# Patient Record
Sex: MALE | Race: WHITE | Employment: OTHER | URBAN - METROPOLITAN AREA
[De-identification: names, ages, dates, MRNs, and addresses within clinical notes are randomized per-mention and may not be internally consistent; named-entity substitution may affect disease eponyms.]

---

## 2022-09-09 ENCOUNTER — APPOINTMENT (OUTPATIENT)
Dept: CT IMAGING | Age: 72
DRG: 389 | End: 2022-09-09
Payer: MEDICARE

## 2022-09-09 ENCOUNTER — HOSPITAL ENCOUNTER (INPATIENT)
Age: 72
LOS: 2 days | Discharge: HOME | DRG: 389 | End: 2022-09-11
Attending: EMERGENCY MEDICINE | Admitting: STUDENT IN AN ORGANIZED HEALTH CARE EDUCATION/TRAINING PROGRAM
Payer: MEDICARE

## 2022-09-09 DIAGNOSIS — C22.9 MALIGNANT NEOPLASM OF LIVER, UNSPECIFIED LIVER MALIGNANCY TYPE (HCC): ICD-10-CM

## 2022-09-09 DIAGNOSIS — Z71.89 GOALS OF CARE, COUNSELING/DISCUSSION: ICD-10-CM

## 2022-09-09 DIAGNOSIS — F17.200 SMOKER: ICD-10-CM

## 2022-09-09 DIAGNOSIS — K56.609 SMALL BOWEL OBSTRUCTION (HCC): Primary | ICD-10-CM

## 2022-09-09 LAB
A/G RATIO: 1.6 (ref 1.1–2.2)
ALBUMIN SERPL-MCNC: 4.3 G/DL (ref 3.4–5)
ALP BLD-CCNC: 112 U/L (ref 40–129)
ALT SERPL-CCNC: 12 U/L (ref 10–40)
ANION GAP SERPL CALCULATED.3IONS-SCNC: 11 MMOL/L (ref 3–16)
AST SERPL-CCNC: 10 U/L (ref 15–37)
BASOPHILS ABSOLUTE: 0 K/UL (ref 0–0.2)
BASOPHILS RELATIVE PERCENT: 0.3 %
BILIRUB SERPL-MCNC: <0.2 MG/DL (ref 0–1)
BILIRUBIN URINE: NEGATIVE
BLOOD, URINE: NEGATIVE
BUN BLDV-MCNC: 10 MG/DL (ref 7–20)
CALCIUM SERPL-MCNC: 9.6 MG/DL (ref 8.3–10.6)
CHLORIDE BLD-SCNC: 97 MMOL/L (ref 99–110)
CLARITY: CLEAR
CO2: 25 MMOL/L (ref 21–32)
COLOR: YELLOW
CREAT SERPL-MCNC: <0.5 MG/DL (ref 0.8–1.3)
EOSINOPHILS ABSOLUTE: 0.1 K/UL (ref 0–0.6)
EOSINOPHILS RELATIVE PERCENT: 1.1 %
GFR AFRICAN AMERICAN: >60
GFR NON-AFRICAN AMERICAN: >60
GLUCOSE BLD-MCNC: 182 MG/DL (ref 70–99)
GLUCOSE BLD-MCNC: 202 MG/DL (ref 70–99)
GLUCOSE URINE: NEGATIVE MG/DL
HCT VFR BLD CALC: 43 % (ref 40.5–52.5)
HEMOGLOBIN: 14.4 G/DL (ref 13.5–17.5)
KETONES, URINE: NEGATIVE MG/DL
LEUKOCYTE ESTERASE, URINE: NEGATIVE
LIPASE: 16 U/L (ref 13–60)
LYMPHOCYTES ABSOLUTE: 1.8 K/UL (ref 1–5.1)
LYMPHOCYTES RELATIVE PERCENT: 16 %
MCH RBC QN AUTO: 30.2 PG (ref 26–34)
MCHC RBC AUTO-ENTMCNC: 33.5 G/DL (ref 31–36)
MCV RBC AUTO: 90.1 FL (ref 80–100)
MICROSCOPIC EXAMINATION: NORMAL
MONOCYTES ABSOLUTE: 0.8 K/UL (ref 0–1.3)
MONOCYTES RELATIVE PERCENT: 7.5 %
NEUTROPHILS ABSOLUTE: 8.4 K/UL (ref 1.7–7.7)
NEUTROPHILS RELATIVE PERCENT: 75.1 %
NITRITE, URINE: NEGATIVE
PDW BLD-RTO: 15 % (ref 12.4–15.4)
PERFORMED ON: ABNORMAL
PH UA: 7 (ref 5–8)
PLATELET # BLD: 262 K/UL (ref 135–450)
PMV BLD AUTO: 7.7 FL (ref 5–10.5)
POTASSIUM REFLEX MAGNESIUM: 4.1 MMOL/L (ref 3.5–5.1)
PROTEIN UA: NEGATIVE MG/DL
RBC # BLD: 4.77 M/UL (ref 4.2–5.9)
SODIUM BLD-SCNC: 133 MMOL/L (ref 136–145)
SPECIFIC GRAVITY UA: 1.06 (ref 1–1.03)
TOTAL PROTEIN: 7 G/DL (ref 6.4–8.2)
URINE REFLEX TO CULTURE: NORMAL
URINE TYPE: NORMAL
UROBILINOGEN, URINE: 1 E.U./DL
WBC # BLD: 11.1 K/UL (ref 4–11)

## 2022-09-09 PROCEDURE — 6370000000 HC RX 637 (ALT 250 FOR IP): Performed by: NURSE PRACTITIONER

## 2022-09-09 PROCEDURE — 2580000003 HC RX 258: Performed by: NURSE PRACTITIONER

## 2022-09-09 PROCEDURE — 96374 THER/PROPH/DIAG INJ IV PUSH: CPT

## 2022-09-09 PROCEDURE — 6360000002 HC RX W HCPCS

## 2022-09-09 PROCEDURE — 85025 COMPLETE CBC W/AUTO DIFF WBC: CPT

## 2022-09-09 PROCEDURE — 99285 EMERGENCY DEPT VISIT HI MDM: CPT

## 2022-09-09 PROCEDURE — 2580000003 HC RX 258

## 2022-09-09 PROCEDURE — 81003 URINALYSIS AUTO W/O SCOPE: CPT

## 2022-09-09 PROCEDURE — 74177 CT ABD & PELVIS W/CONTRAST: CPT

## 2022-09-09 PROCEDURE — 80053 COMPREHEN METABOLIC PANEL: CPT

## 2022-09-09 PROCEDURE — 6360000004 HC RX CONTRAST MEDICATION

## 2022-09-09 PROCEDURE — 96375 TX/PRO/DX INJ NEW DRUG ADDON: CPT

## 2022-09-09 PROCEDURE — 83690 ASSAY OF LIPASE: CPT

## 2022-09-09 PROCEDURE — 1200000000 HC SEMI PRIVATE

## 2022-09-09 RX ORDER — ALBUTEROL SULFATE 90 UG/1
2 AEROSOL, METERED RESPIRATORY (INHALATION) EVERY 4 HOURS PRN
Status: DISCONTINUED | OUTPATIENT
Start: 2022-09-09 | End: 2022-09-11 | Stop reason: HOSPADM

## 2022-09-09 RX ORDER — MORPHINE SULFATE 30 MG/1
30 TABLET, FILM COATED, EXTENDED RELEASE ORAL EVERY 12 HOURS SCHEDULED
Status: DISCONTINUED | OUTPATIENT
Start: 2022-09-09 | End: 2022-09-11 | Stop reason: HOSPADM

## 2022-09-09 RX ORDER — SODIUM CHLORIDE, SODIUM LACTATE, POTASSIUM CHLORIDE, CALCIUM CHLORIDE 600; 310; 30; 20 MG/100ML; MG/100ML; MG/100ML; MG/100ML
INJECTION, SOLUTION INTRAVENOUS CONTINUOUS
Status: DISCONTINUED | OUTPATIENT
Start: 2022-09-09 | End: 2022-09-11 | Stop reason: HOSPADM

## 2022-09-09 RX ORDER — ACETAMINOPHEN 650 MG/1
650 SUPPOSITORY RECTAL EVERY 6 HOURS PRN
Status: DISCONTINUED | OUTPATIENT
Start: 2022-09-09 | End: 2022-09-11 | Stop reason: HOSPADM

## 2022-09-09 RX ORDER — PIOGLITAZONEHYDROCHLORIDE 45 MG/1
45 TABLET ORAL DAILY
COMMUNITY
Start: 2022-09-07

## 2022-09-09 RX ORDER — SITAGLIPTIN 100 MG/1
100 TABLET, FILM COATED ORAL DAILY
COMMUNITY
Start: 2022-08-18

## 2022-09-09 RX ORDER — ALBUTEROL SULFATE 90 UG/1
1-2 AEROSOL, METERED RESPIRATORY (INHALATION)
COMMUNITY
Start: 2022-09-07

## 2022-09-09 RX ORDER — ALLOPURINOL 300 MG/1
300 TABLET ORAL DAILY
Status: DISCONTINUED | OUTPATIENT
Start: 2022-09-10 | End: 2022-09-11 | Stop reason: HOSPADM

## 2022-09-09 RX ORDER — ATORVASTATIN CALCIUM 10 MG/1
10 TABLET, FILM COATED ORAL DAILY
Status: DISCONTINUED | OUTPATIENT
Start: 2022-09-10 | End: 2022-09-11 | Stop reason: HOSPADM

## 2022-09-09 RX ORDER — DEXTROSE MONOHYDRATE 100 MG/ML
INJECTION, SOLUTION INTRAVENOUS CONTINUOUS PRN
Status: DISCONTINUED | OUTPATIENT
Start: 2022-09-09 | End: 2022-09-11 | Stop reason: HOSPADM

## 2022-09-09 RX ORDER — POLYETHYLENE GLYCOL 3350 17 G/17G
17 POWDER, FOR SOLUTION ORAL DAILY PRN
Status: DISCONTINUED | OUTPATIENT
Start: 2022-09-09 | End: 2022-09-11 | Stop reason: HOSPADM

## 2022-09-09 RX ORDER — GLIPIZIDE 10 MG/1
10 TABLET ORAL
COMMUNITY
Start: 2022-09-07

## 2022-09-09 RX ORDER — SODIUM CHLORIDE 9 MG/ML
INJECTION, SOLUTION INTRAVENOUS PRN
Status: DISCONTINUED | OUTPATIENT
Start: 2022-09-09 | End: 2022-09-11 | Stop reason: HOSPADM

## 2022-09-09 RX ORDER — ONDANSETRON 2 MG/ML
4 INJECTION INTRAMUSCULAR; INTRAVENOUS EVERY 6 HOURS PRN
Status: DISCONTINUED | OUTPATIENT
Start: 2022-09-09 | End: 2022-09-11 | Stop reason: HOSPADM

## 2022-09-09 RX ORDER — OXYCODONE HYDROCHLORIDE 5 MG/1
5 TABLET ORAL EVERY 6 HOURS PRN
Status: DISCONTINUED | OUTPATIENT
Start: 2022-09-09 | End: 2022-09-11 | Stop reason: HOSPADM

## 2022-09-09 RX ORDER — ONDANSETRON 2 MG/ML
4 INJECTION INTRAMUSCULAR; INTRAVENOUS ONCE
Status: COMPLETED | OUTPATIENT
Start: 2022-09-09 | End: 2022-09-09

## 2022-09-09 RX ORDER — SODIUM CHLORIDE 0.9 % (FLUSH) 0.9 %
5-40 SYRINGE (ML) INJECTION EVERY 12 HOURS SCHEDULED
Status: DISCONTINUED | OUTPATIENT
Start: 2022-09-09 | End: 2022-09-11 | Stop reason: HOSPADM

## 2022-09-09 RX ORDER — ENOXAPARIN SODIUM 100 MG/ML
40 INJECTION SUBCUTANEOUS DAILY
Status: DISCONTINUED | OUTPATIENT
Start: 2022-09-10 | End: 2022-09-11 | Stop reason: HOSPADM

## 2022-09-09 RX ORDER — OXYCODONE HYDROCHLORIDE 5 MG/1
5-10 TABLET ORAL EVERY 6 HOURS PRN
COMMUNITY
Start: 2022-08-17

## 2022-09-09 RX ORDER — SODIUM CHLORIDE, SODIUM LACTATE, POTASSIUM CHLORIDE, AND CALCIUM CHLORIDE .6; .31; .03; .02 G/100ML; G/100ML; G/100ML; G/100ML
1000 INJECTION, SOLUTION INTRAVENOUS ONCE
Status: COMPLETED | OUTPATIENT
Start: 2022-09-09 | End: 2022-09-09

## 2022-09-09 RX ORDER — ALLOPURINOL 300 MG/1
300 TABLET ORAL DAILY
COMMUNITY
Start: 2022-07-18

## 2022-09-09 RX ORDER — BUDESONIDE AND FORMOTEROL FUMARATE DIHYDRATE 160; 4.5 UG/1; UG/1
2 AEROSOL RESPIRATORY (INHALATION) 2 TIMES DAILY
COMMUNITY
Start: 2022-09-07

## 2022-09-09 RX ORDER — ONDANSETRON 4 MG/1
4 TABLET, ORALLY DISINTEGRATING ORAL EVERY 8 HOURS PRN
Status: DISCONTINUED | OUTPATIENT
Start: 2022-09-09 | End: 2022-09-11 | Stop reason: HOSPADM

## 2022-09-09 RX ORDER — ACETAMINOPHEN 325 MG/1
650 TABLET ORAL EVERY 6 HOURS PRN
Status: DISCONTINUED | OUTPATIENT
Start: 2022-09-09 | End: 2022-09-11 | Stop reason: HOSPADM

## 2022-09-09 RX ORDER — SODIUM CHLORIDE 0.9 % (FLUSH) 0.9 %
5-40 SYRINGE (ML) INJECTION PRN
Status: DISCONTINUED | OUTPATIENT
Start: 2022-09-09 | End: 2022-09-11 | Stop reason: HOSPADM

## 2022-09-09 RX ORDER — INSULIN LISPRO 100 [IU]/ML
0-4 INJECTION, SOLUTION INTRAVENOUS; SUBCUTANEOUS NIGHTLY
Status: DISCONTINUED | OUTPATIENT
Start: 2022-09-09 | End: 2022-09-11 | Stop reason: HOSPADM

## 2022-09-09 RX ORDER — INSULIN LISPRO 100 [IU]/ML
0-4 INJECTION, SOLUTION INTRAVENOUS; SUBCUTANEOUS
Status: DISCONTINUED | OUTPATIENT
Start: 2022-09-10 | End: 2022-09-11 | Stop reason: HOSPADM

## 2022-09-09 RX ORDER — ATORVASTATIN CALCIUM 10 MG/1
10 TABLET, FILM COATED ORAL DAILY
COMMUNITY
Start: 2022-08-31

## 2022-09-09 RX ORDER — MORPHINE SULFATE 30 MG/1
30 TABLET, FILM COATED, EXTENDED RELEASE ORAL 2 TIMES DAILY
COMMUNITY
Start: 2022-09-07

## 2022-09-09 RX ADMIN — IOPAMIDOL 75 ML: 755 INJECTION, SOLUTION INTRAVENOUS at 20:31

## 2022-09-09 RX ADMIN — MORPHINE SULFATE 30 MG: 30 TABLET, FILM COATED, EXTENDED RELEASE ORAL at 23:47

## 2022-09-09 RX ADMIN — ONDANSETRON 4 MG: 2 INJECTION INTRAMUSCULAR; INTRAVENOUS at 21:00

## 2022-09-09 RX ADMIN — SODIUM CHLORIDE, POTASSIUM CHLORIDE, SODIUM LACTATE AND CALCIUM CHLORIDE 1000 ML: 600; 310; 30; 20 INJECTION, SOLUTION INTRAVENOUS at 21:04

## 2022-09-09 RX ADMIN — HYDROMORPHONE HYDROCHLORIDE 0.5 MG: 1 INJECTION, SOLUTION INTRAMUSCULAR; INTRAVENOUS; SUBCUTANEOUS at 21:00

## 2022-09-09 RX ADMIN — Medication 10 ML: at 23:50

## 2022-09-09 ASSESSMENT — ENCOUNTER SYMPTOMS
CONSTIPATION: 1
BACK PAIN: 0
EYE PAIN: 0
COUGH: 0
ABDOMINAL PAIN: 1
VOMITING: 0
DIARRHEA: 0
ABDOMINAL DISTENTION: 1
SHORTNESS OF BREATH: 0
NAUSEA: 0
SORE THROAT: 0
RHINORRHEA: 0

## 2022-09-09 ASSESSMENT — PAIN DESCRIPTION - DESCRIPTORS
DESCRIPTORS: ACHING
DESCRIPTORS: ACHING

## 2022-09-09 ASSESSMENT — PAIN DESCRIPTION - LOCATION
LOCATION: ABDOMEN

## 2022-09-09 ASSESSMENT — PAIN - FUNCTIONAL ASSESSMENT
PAIN_FUNCTIONAL_ASSESSMENT: PREVENTS OR INTERFERES SOME ACTIVE ACTIVITIES AND ADLS
PAIN_FUNCTIONAL_ASSESSMENT: 0-10

## 2022-09-09 ASSESSMENT — PAIN SCALES - GENERAL
PAINLEVEL_OUTOF10: 10

## 2022-09-09 ASSESSMENT — PAIN DESCRIPTION - ORIENTATION
ORIENTATION: MID

## 2022-09-10 PROBLEM — E11.9 TYPE 2 DIABETES MELLITUS WITHOUT COMPLICATION, WITHOUT LONG-TERM CURRENT USE OF INSULIN (HCC): Status: ACTIVE | Noted: 2022-09-10

## 2022-09-10 PROBLEM — Z72.0 TOBACCO ABUSE: Status: ACTIVE | Noted: 2022-09-10

## 2022-09-10 LAB
ANION GAP SERPL CALCULATED.3IONS-SCNC: 13 MMOL/L (ref 3–16)
BASOPHILS ABSOLUTE: 0 K/UL (ref 0–0.2)
BASOPHILS RELATIVE PERCENT: 0.1 %
BUN BLDV-MCNC: 11 MG/DL (ref 7–20)
CALCIUM SERPL-MCNC: 9.8 MG/DL (ref 8.3–10.6)
CHLORIDE BLD-SCNC: 101 MMOL/L (ref 99–110)
CO2: 25 MMOL/L (ref 21–32)
CREAT SERPL-MCNC: 0.6 MG/DL (ref 0.8–1.3)
EOSINOPHILS ABSOLUTE: 0.1 K/UL (ref 0–0.6)
EOSINOPHILS RELATIVE PERCENT: 1.5 %
GFR AFRICAN AMERICAN: >60
GFR NON-AFRICAN AMERICAN: >60
GLUCOSE BLD-MCNC: 163 MG/DL (ref 70–99)
GLUCOSE BLD-MCNC: 166 MG/DL (ref 70–99)
GLUCOSE BLD-MCNC: 180 MG/DL (ref 70–99)
GLUCOSE BLD-MCNC: 184 MG/DL (ref 70–99)
GLUCOSE BLD-MCNC: 251 MG/DL (ref 70–99)
HCT VFR BLD CALC: 44.3 % (ref 40.5–52.5)
HEMOGLOBIN: 14.7 G/DL (ref 13.5–17.5)
LYMPHOCYTES ABSOLUTE: 2.2 K/UL (ref 1–5.1)
LYMPHOCYTES RELATIVE PERCENT: 21.6 %
MCH RBC QN AUTO: 30.5 PG (ref 26–34)
MCHC RBC AUTO-ENTMCNC: 33.2 G/DL (ref 31–36)
MCV RBC AUTO: 92 FL (ref 80–100)
MONOCYTES ABSOLUTE: 0.9 K/UL (ref 0–1.3)
MONOCYTES RELATIVE PERCENT: 8.9 %
NEUTROPHILS ABSOLUTE: 6.8 K/UL (ref 1.7–7.7)
NEUTROPHILS RELATIVE PERCENT: 67.9 %
PDW BLD-RTO: 15.1 % (ref 12.4–15.4)
PERFORMED ON: ABNORMAL
PLATELET # BLD: 266 K/UL (ref 135–450)
PMV BLD AUTO: 7.7 FL (ref 5–10.5)
POTASSIUM REFLEX MAGNESIUM: 4.4 MMOL/L (ref 3.5–5.1)
RBC # BLD: 4.82 M/UL (ref 4.2–5.9)
SODIUM BLD-SCNC: 139 MMOL/L (ref 136–145)
WBC # BLD: 10 K/UL (ref 4–11)

## 2022-09-10 PROCEDURE — 1200000000 HC SEMI PRIVATE

## 2022-09-10 PROCEDURE — 6370000000 HC RX 637 (ALT 250 FOR IP): Performed by: PHYSICIAN ASSISTANT

## 2022-09-10 PROCEDURE — APPNB15 APP NON BILLABLE TIME 0-15 MINS: Performed by: PHYSICIAN ASSISTANT

## 2022-09-10 PROCEDURE — 99221 1ST HOSP IP/OBS SF/LOW 40: CPT | Performed by: SURGERY

## 2022-09-10 PROCEDURE — 94760 N-INVAS EAR/PLS OXIMETRY 1: CPT

## 2022-09-10 PROCEDURE — 2580000003 HC RX 258: Performed by: NURSE PRACTITIONER

## 2022-09-10 PROCEDURE — 85025 COMPLETE CBC W/AUTO DIFF WBC: CPT

## 2022-09-10 PROCEDURE — 36415 COLL VENOUS BLD VENIPUNCTURE: CPT

## 2022-09-10 PROCEDURE — APPSS15 APP SPLIT SHARED TIME 0-15 MINUTES: Performed by: PHYSICIAN ASSISTANT

## 2022-09-10 PROCEDURE — 94640 AIRWAY INHALATION TREATMENT: CPT

## 2022-09-10 PROCEDURE — 80048 BASIC METABOLIC PNL TOTAL CA: CPT

## 2022-09-10 PROCEDURE — 6370000000 HC RX 637 (ALT 250 FOR IP): Performed by: NURSE PRACTITIONER

## 2022-09-10 PROCEDURE — 6360000002 HC RX W HCPCS: Performed by: NURSE PRACTITIONER

## 2022-09-10 RX ORDER — NICOTINE 21 MG/24HR
1 PATCH, TRANSDERMAL 24 HOURS TRANSDERMAL DAILY
Status: DISCONTINUED | OUTPATIENT
Start: 2022-09-10 | End: 2022-09-11 | Stop reason: HOSPADM

## 2022-09-10 RX ADMIN — SODIUM CHLORIDE, POTASSIUM CHLORIDE, SODIUM LACTATE AND CALCIUM CHLORIDE: 600; 310; 30; 20 INJECTION, SOLUTION INTRAVENOUS at 00:30

## 2022-09-10 RX ADMIN — SODIUM CHLORIDE, POTASSIUM CHLORIDE, SODIUM LACTATE AND CALCIUM CHLORIDE: 600; 310; 30; 20 INJECTION, SOLUTION INTRAVENOUS at 11:06

## 2022-09-10 RX ADMIN — SODIUM CHLORIDE, POTASSIUM CHLORIDE, SODIUM LACTATE AND CALCIUM CHLORIDE: 600; 310; 30; 20 INJECTION, SOLUTION INTRAVENOUS at 22:44

## 2022-09-10 RX ADMIN — MORPHINE SULFATE 30 MG: 30 TABLET, FILM COATED, EXTENDED RELEASE ORAL at 21:06

## 2022-09-10 RX ADMIN — ENOXAPARIN SODIUM 40 MG: 100 INJECTION SUBCUTANEOUS at 08:46

## 2022-09-10 RX ADMIN — MOMETASONE FUROATE AND FORMOTEROL FUMARATE DIHYDRATE 2 PUFF: 200; 5 AEROSOL RESPIRATORY (INHALATION) at 09:06

## 2022-09-10 RX ADMIN — ATORVASTATIN CALCIUM 10 MG: 10 TABLET, FILM COATED ORAL at 08:46

## 2022-09-10 RX ADMIN — ALLOPURINOL 300 MG: 300 TABLET ORAL at 08:46

## 2022-09-10 RX ADMIN — MORPHINE SULFATE 30 MG: 30 TABLET, FILM COATED, EXTENDED RELEASE ORAL at 08:46

## 2022-09-10 RX ADMIN — MOMETASONE FUROATE AND FORMOTEROL FUMARATE DIHYDRATE 2 PUFF: 200; 5 AEROSOL RESPIRATORY (INHALATION) at 19:36

## 2022-09-10 ASSESSMENT — PAIN SCALES - GENERAL: PAINLEVEL_OUTOF10: 0

## 2022-09-10 NOTE — PROGRESS NOTES
Medication Reconciliation     List of medications patient is currently taking is complete. Source of information:   1. Conversation with patient at bedside  2. EPIC records        Notes regarding home medications:  1. Patient received all of his morning home medications today. 2. Added all medications to med list   3. No additional chronic OTC/supplement medication use.     Jero Dia, Pharmacy Intern  9/9/2022 9:35 PM

## 2022-09-10 NOTE — PROGRESS NOTES
bilaterally. Full range of motion without deformity. Skin: Skin color, texture, turgor normal.  No rashes or lesions. Neurologic:  Neurovascularly intact without any focal sensory/motor deficits. Cranial nerves: II-XII intact, grossly non-focal.  Psychiatric: Alert and oriented, thought content appropriate, normal insight  Capillary Refill: Brisk,< 3 seconds   Peripheral Pulses: +2 palpable, equal bilaterally       Labs:   Recent Labs     09/09/22  1935 09/10/22  0837   WBC 11.1* 10.0   HGB 14.4 14.7   HCT 43.0 44.3    266     Recent Labs     09/09/22  1935 09/10/22  0837   * 139   K 4.1 4.4   CL 97* 101   CO2 25 25   BUN 10 11   CREATININE <0.5* 0.6*   CALCIUM 9.6 9.8     Recent Labs     09/09/22 1935   AST 10*   ALT 12   BILITOT <0.2   ALKPHOS 112     No results for input(s): INR in the last 72 hours. No results for input(s): Curlie Lat in the last 72 hours. Urinalysis:      Lab Results   Component Value Date/Time    NITRU Negative 09/09/2022 09:13 PM    BLOODU Negative 09/09/2022 09:13 PM    SPECGRAV 1.060 09/09/2022 09:13 PM    GLUCOSEU Negative 09/09/2022 09:13 PM       Radiology:  CT ABDOMEN PELVIS W IV CONTRAST Additional Contrast? None   Final Result   1. Small bowel obstruction with somewhat gradual transition point in the   distal ileum. Fecalization of contents within distal ileal bowel loops is   compatible with slow transit. 2.  Moderate colonic stool burden. 3.  There is mild gastric wall thickening and mucosal hyperenhancement which   can be seen in gastritis. Correlate with clinical symptoms. 4.  Moderately prominent lucia hepatic lymph node, nonspecific. 5.  Multiple subcentimeter hypoattenuating hepatic lesions in the context of   reported hepatic metastatic disease.                  Assessment/Plan:    Active Hospital Problems    Diagnosis Date Noted    Tobacco abuse [Z72.0] 09/10/2022     Priority: Medium    Metastatic squamous cell carcinoma [facility]) [C79.9] 09/10/2022     Priority: Medium    Type 2 diabetes mellitus without complication, without long-term current use of insulin ([facility] [address] [E11.9] 09/10/2022     Priority: Medium    Small bowel obstruction [facility]) [K56.609] 09/09/2022     Priority: Medium     SBO:  -Mild and appears to be resolving spontaneously  -Seen by general surgery  -Okay for clear liquid diet    DM: Accu-Cheks every 4 hours, hypoglycemic protocol, n.p.o. 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

## 2022-09-10 NOTE — PLAN OF CARE
Problem: Discharge Planning  Goal: Discharge to home or other facility with appropriate resources  9/10/2022 1150 by Marii Cartwright RN  Outcome: Progressing  Flowsheets (Taken 9/10/2022 0855)  Discharge to home or other facility with appropriate resources: Identify barriers to discharge with patient and caregiver  9/10/2022 0122 by Gerardo White RN  Outcome: Progressing  Flowsheets (Taken 9/10/2022 0055)  Discharge to home or other facility with appropriate resources: Identify barriers to discharge with patient and caregiver     Problem: Pain  Goal: Verbalizes/displays adequate comfort level or baseline comfort level  9/10/2022 1150 by Marii Cartwright RN  Outcome: Progressing  9/10/2022 0122 by Gerardo White RN  Outcome: Progressing     Problem: Safety - Adult  Goal: Free from fall injury  9/10/2022 1150 by Marii Cartwright RN  Outcome: Progressing  Flowsheets (Taken 9/10/2022 1110)  Free From Fall Injury: Instruct family/caregiver on patient safety  9/10/2022 0122 by Greardo White RN  Outcome: Progressing     Problem: ABCDS Injury Assessment  Goal: Absence of physical injury  Outcome: Progressing

## 2022-09-10 NOTE — CONSULTS
Historical Provider, MD   pioglitazone (ACTOS) 45 MG tablet Take 45 mg by mouth daily 9/7/22   Historical Provider, MD   oxyCODONE (ROXICODONE) 5 MG immediate release tablet Take 5-10 mg by mouth every 6 hours as needed for Pain. 8/17/22   Historical Provider, MD   morphine (MS CONTIN) 30 MG extended release tablet Take 30 mg by mouth 2 times daily.  9/7/22   Historical Provider, MD   metFORMIN (GLUCOPHAGE) 1000 MG tablet Take 1,000 mg by mouth 2 times daily (with meals) 8/17/22   Historical Provider, MD   glipiZIDE (GLUCOTROL) 10 MG tablet Take 10 mg by mouth 2 times daily (before meals) 9/7/22   Historical Provider, MD   budesonide-formoterol (SYMBICORT) 160-4.5 MCG/ACT AERO Inhale 2 puffs into the lungs in the morning and at bedtime 9/7/22   Historical Provider, MD   atorvastatin (LIPITOR) 10 MG tablet Take 10 mg by mouth daily 8/31/22   Historical Provider, MD   allopurinol (ZYLOPRIM) 300 MG tablet Take 300 mg by mouth daily 7/18/22   Historical Provider, MD   albuterol sulfate HFA (PROVENTIL;VENTOLIN;PROAIR) 108 (90 Base) MCG/ACT inhaler Inhale 1-2 puffs into the lungs every 4-6 hours as needed 9/7/22   Historical Provider, MD    Scheduled Meds:   nicotine  1 patch TransDERmal Daily    allopurinol  300 mg Oral Daily    atorvastatin  10 mg Oral Daily    mometasone-formoterol  2 puff Inhalation BID    morphine  30 mg Oral 2 times per day    sodium chloride flush  5-40 mL IntraVENous 2 times per day    enoxaparin  40 mg SubCUTAneous Daily    insulin lispro  0-4 Units SubCUTAneous TID WC    insulin lispro  0-4 Units SubCUTAneous Nightly     Continuous Infusions:   sodium chloride      dextrose      lactated ringers 100 mL/hr at 09/10/22 0030     PRN Meds:.albuterol sulfate HFA, oxyCODONE, sodium chloride flush, sodium chloride, ondansetron **OR** ondansetron, polyethylene glycol, acetaminophen **OR** acetaminophen, glucose, dextrose bolus **OR** dextrose bolus, glucagon (rDNA), dextrose  Allergies  has No Known Allergies. Family History  Reviewedfamily history is not on file. Social History   reports that he has been smoking cigarettes. He has a 50.00 pack-year smoking history. He uses smokeless tobacco.  EDUCATION  Patient educated about their illness/diagnosis, stated above, and all questions answered. We discussed the importance of nutrition, medications they are taking, and healthy lifestyle. Review of Systems:  General Denies any fever or chills  HEENT Denies any diplopia, tinnitus or vertigo  Resp Denies any shortness of breath, cough or wheezing  Cardiac Denies any chest pain, palpitations, claudication or edema  GI Denies any melena, hematochezia, hematemesis or pyrosis   Denies any frequency, urgency, hesitancy or incontinence  Heme Denies bruising or bleeding easily  Neuro Denies any focal motor or sensory deficits  OBJECTIVE:   VITALS:  height is 6' 1\" (1.854 m) and weight is 164 lb 0.4 oz (74.4 kg). His oral temperature is 97.8 °F (36.6 °C). His blood pressure is 112/71 and his pulse is 74. His respiration is 16 and oxygen saturation is 93%. CONSTITUTIONAL: Alert and oriented times 3, no acute distress and cooperative to examination with proper mood and affect. SKIN: Skin color, texture, turgor normal. No rashes or lesions. LYMPH: no cervical nodes, no inguinal nodes  HEENT: Head is normocephalic, atraumatic. EOMI, PERRLA. NECK: Supple, symmetrical, trachea midline, no adenopathy, thyroid symmetric, not enlarged and no tenderness, skin normal.  CHEST/LUNGS: chest symmetric with normal A/P diameter, normal respiratory rate and rhythm  CARDIOVASCULAR: Heart sounds are normal.  Regular rate and rhythm   ABDOMEN: Normal shape. Tenderness: diffuse. No rebound or guarding   RECTAL: deferred, not clinically indicated  NEUROLOGIC: There are no focalizing motor or sensory deficits. CN II-XII are grossly intact. Linsey Crape EXTREMITIES: no cyanosis, no clubbing, and no edema.   LABS:     Recent Labs     09/09/22 1935 09/09/22  2113 09/10/22  0837   WBC 11.1*  --  10.0   HGB 14.4  --  14.7   HCT 43.0  --  44.3     --  266   *  --   --    K 4.1  --   --    CL 97*  --   --    CO2 25  --   --    BUN 10  --   --    CREATININE <0.5*  --   --    CALCIUM 9.6  --   --    AST 10*  --   --    ALT 12  --   --    BILITOT <0.2  --   --    NITRU  --  Negative  --    COLORU  --  Yellow  --      Recent Labs     09/09/22  1935   ALKPHOS 112   ALT 12   AST 10*   BILITOT <0.2   LABALBU 4.3   LIPASE 16.0     CBC:   Lab Results   Component Value Date/Time    WBC 10.0 09/10/2022 08:37 AM    RBC 4.82 09/10/2022 08:37 AM    HGB 14.7 09/10/2022 08:37 AM    HCT 44.3 09/10/2022 08:37 AM    MCV 92.0 09/10/2022 08:37 AM    MCH 30.5 09/10/2022 08:37 AM    MCHC 33.2 09/10/2022 08:37 AM    RDW 15.1 09/10/2022 08:37 AM     09/10/2022 08:37 AM    MPV 7.7 09/10/2022 08:37 AM     CMP:    Lab Results   Component Value Date/Time     09/10/2022 08:37 AM    K 4.4 09/10/2022 08:37 AM     09/10/2022 08:37 AM    CO2 25 09/10/2022 08:37 AM    BUN 11 09/10/2022 08:37 AM    CREATININE 0.6 09/10/2022 08:37 AM    GFRAA >60 09/10/2022 08:37 AM    AGRATIO 1.6 09/09/2022 07:35 PM    LABGLOM >60 09/10/2022 08:37 AM    GLUCOSE 166 09/10/2022 08:37 AM    PROT 7.0 09/09/2022 07:35 PM    LABALBU 4.3 09/09/2022 07:35 PM    CALCIUM 9.8 09/10/2022 08:37 AM    BILITOT <0.2 09/09/2022 07:35 PM    ALKPHOS 112 09/09/2022 07:35 PM    AST 10 09/09/2022 07:35 PM    ALT 12 09/09/2022 07:35 PM     Urine Culture:  No components found for: CURINE  Blood Culture:  No components found for: CBLOOD, CFUNGUSBL  RADIOLOGY:     CT scan abd/pelvis (9/9/22):   1. Small bowel obstruction with somewhat gradual transition point in the   distal ileum. Fecalization of contents within distal ileal bowel loops is   compatible with slow transit. 2.  Moderate colonic stool burden.      3.  There is mild gastric wall thickening and mucosal hyperenhancement which   can be seen in gastritis. Correlate with clinical symptoms. 4.  Moderately prominent lucia hepatic lymph node, nonspecific. 5.  Multiple subcentimeter hypoattenuating hepatic lesions in the context of   reported hepatic metastatic disease. Thank you for the interesting evaluation. Further recommendations to follow. Megan Mullen  General and Vascular Surgery [phone]  Electronically signed by Merissa Parks PA-C on 9/10/2022 at 9:42 AM    Agree with above note. The patient was personally seen and examined. Eduardo Morse is a 68 yo male with history of metastatic squamous cell carcinoma currently getting chemotherapy, current 3-4 ppd smoker, hx of lap cholecystectomy, who presents with a 2 day history of diffuse abdominal pain, nausea and emesis. His pain progressively worsened, which led him to come in to be evaluated. No fevers or chills. He did feel bloated yesterday but has been passing flatus and feels back to normal today. His pain has improved since admission. Hx of metatastic lung CA, COPD, DM     NAD, appears stated age  Normal respiratory effort, no accessory muscle use  RRR  Abdomen soft, NT, ND, well healed laparoscopic scars  Ext no cyanosis or clubbing    WBC 10  Cr 0.6    CT abd/pelvis personally reviewed, showing distended small bowel with some fecalization, no abrupt transition point, no inflammation    A/P: 68 yo male with ileus vs gastroenteritis, less likely partial SBO    Ileus vs gastroenteritis, less likely pSBO - feeling better. [id number] Maris Zhu for clears. IV hydration.   No plans for surgical intervention    Tobacco abuse - nicotine patch ordered    DM, NSCLC - per primary team    Nishant Weiner MD

## 2022-09-10 NOTE — PLAN OF CARE
Problem: Discharge Planning  Goal: Discharge to home or other facility with appropriate resources  Outcome: Progressing  Flowsheets (Taken 9/10/2022 0055)  Discharge to home or other facility with appropriate resources: Identify barriers to discharge with patient and caregiver     Problem: Pain  Goal: Verbalizes/displays adequate comfort level or baseline comfort level  Outcome: Progressing     Problem: Safety - Adult  Goal: Free from fall injury  Outcome: Progressing

## 2022-09-10 NOTE — ED NOTES
1st attempt to call report to 3N. Nurse asked to be called back in 15 min.       Shabnam Narvaez RN  09/09/22 [id number]

## 2022-09-10 NOTE — ED PROVIDER NOTES
[address] [facility]        Pt Name: Wil Gonzalez  MRN: [id number]  [place] 1950  Date of evaluation: 9/9/2022  Provider: GRABIEL Robles CNP  PCP: Preethi Keene  Note Started: 11:28 PM EDT       I have seen and evaluated this patient with my supervising physician Dr. Cammie Snow. Triage CHIEF COMPLAINT       Chief Complaint   Patient presents with    Abdominal Pain     Abdominal pain that started yesterday. Emesis 1 yesterday. No appetite. 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 HISTORY OF PRESENT ILLNESS   (Location/Symptom, Timing/Onset, Context/Setting, Quality, Duration, Modifying Factors, Severity)  Note limiting factors. Chief Complaint: Above    Wil Gonzalez is a 67 y.o. male who presents to the ED for evaluation of abdominal pain ongoing for the past 4 days acutely worse over the past 2 days associated with 1 episode of vomiting, nausea and feeling like his abdomen is distended. Denies fevers or chills. 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 Managed by [facility] but does not remember name of his provider. Completed radiation and is now getting chemo with last treatment 2 weeks ago    Nursing Notes were all reviewed and agreed with or any disagreements were addressed in the HPI. REVIEW OF SYSTEMS    (2-9 systems for level 4, 10 or more for level 5)     Review of Systems   Constitutional:  Positive for activity change and appetite change. Negative for chills, diaphoresis and fever. HENT:  Negative for congestion, rhinorrhea and sore throat. Eyes:  Negative for pain and visual disturbance. Respiratory:  Negative for cough and shortness of breath. Cardiovascular:  Negative for chest pain and leg swelling. Gastrointestinal:  Positive for abdominal distention, abdominal pain and constipation. Negative for diarrhea, nausea and vomiting. Genitourinary:  Negative for frequency and hematuria. Musculoskeletal:  Negative for back pain and neck pain. Skin:  Negative for rash and wound. Neurological:  Negative for dizziness and light-headedness. PAST MEDICAL HISTORY   History reviewed. No pertinent past medical history. SURGICAL HISTORY   History reviewed. No pertinent surgical history. [id number]       Current Discharge Medication List        CONTINUE these medications which have NOT CHANGED    Details   JANUVIA 100 MG tablet TAKE 1 TABLET BY MOUTH DAILY FOR DIABETES      pioglitazone (ACTOS) 45 MG tablet Take 1 tablet by mouth daily      oxyCODONE (ROXICODONE) 5 MG immediate release tablet TAKE 1 TO 2 TABLETS BY MOUTH EVERY 6 HOURS AS NEEDED FOR SEVERE PAIN      morphine (MS CONTIN) 30 MG extended release tablet TAKE 1 TABLET BY MOUTH EVERY 12 HOURS      metFORMIN (GLUCOPHAGE) 1000 MG tablet TAKE 1 TABLET BY MOUTH TWICE DAILY WITH MEALS      glipiZIDE (GLUCOTROL) 10 MG tablet Take 1 tablet by mouth in the morning and at bedtime      budesonide-formoterol (SYMBICORT) 160-4.5 MCG/ACT AERO Inhale 2 puffs into the lungs in the morning and at bedtime      atorvastatin (LIPITOR) 10 MG tablet Take 10 mg by mouth daily      allopurinol (ZYLOPRIM) 300 MG tablet TAKE 1 TABLET BY MOUTH DAILY      albuterol sulfate HFA (PROVENTIL;VENTOLIN;PROAIR) 108 (90 Base) MCG/ACT inhaler Inhale 1-2 puffs into the lungs every 4-6 hours as needed             ALLERGIES     Patient has no known allergies. FAMILYHISTORY     History reviewed. No pertinent family history. SOCIAL HISTORY       Social History     Socioeconomic History    Marital status:       Spouse name: None    Number of children: None    Years of education: None    Highest education level: None   Tobacco Use    Smoking status: Every Day     Packs/day: 1.00     Years: 50.00     Pack years: 50.00     Types: Cigarettes    Smokeless tobacco: Current       SCREENINGS    [place] Coma Scale  Eye Opening: Spontaneous  Best Verbal Response: Oriented  Best Motor Response: Obeys commands  [place] Coma Scale Score: 15        PHYSICAL EXAM    (up to 7 for level 4, 8 or more for level 5)     ED Triage Vitals [09/09/22 1924]   BP Temp Temp Source Heart Rate Resp SpO2 Height Weight   (!) 142/79 97.3 °F (36.3 °C) Oral (!) 103 20 95 % -- --       Physical Exam  Vitals and nursing note reviewed. Constitutional:       General: He is in acute distress. Appearance: Normal appearance. He is normal weight. He is ill-appearing. He is not diaphoretic. HENT:      Head: Normocephalic and atraumatic. Right Ear: External ear normal.      Left Ear: External ear normal.      Nose: Nose normal. No congestion or rhinorrhea. Mouth/Throat:      Mouth: Mucous membranes are moist.      Pharynx: Oropharynx is clear. No oropharyngeal exudate or posterior oropharyngeal erythema. Eyes:      General: No scleral icterus. Right eye: No discharge. Left eye: No discharge. Extraocular Movements: Extraocular movements intact. Conjunctiva/sclera: Conjunctivae normal.      Pupils: Pupils are equal, round, and reactive to light. Cardiovascular:      Rate and Rhythm: Normal rate and regular rhythm. Pulses: Normal pulses. Heart sounds: Normal heart sounds. No murmur heard. No friction rub. No gallop. Pulmonary:      Effort: Pulmonary effort is normal. No respiratory distress. Breath sounds: Normal breath sounds. No stridor. No wheezing, rhonchi or rales. Abdominal:      General: Abdomen is flat. Bowel sounds are normal. There is no distension. Palpations: Abdomen is soft. Tenderness: There is abdominal tenderness in the epigastric area. There is no right CVA tenderness, left CVA tenderness or guarding. Musculoskeletal:         General: Normal range of motion. Cervical back: Normal range of motion and neck supple. No rigidity. Lymphadenopathy:      Cervical: No cervical adenopathy.    Skin:     General: Skin is warm and dry. Capillary Refill: Capillary refill takes less than 2 seconds. Coloration: Skin is not jaundiced or pale. Findings: No bruising or rash. Neurological:      General: No focal deficit present. Mental Status: He is alert and oriented to person, place, and time. Mental status is at baseline. Psychiatric:         Mood and Affect: Mood normal.         Behavior: Behavior normal.       DIAGNOSTIC RESULTS   LABS:    Labs Reviewed   CBC WITH AUTO DIFFERENTIAL - Abnormal; Notable for the following components:       Result Value    WBC 11.1 (*)     Neutrophils Absolute 8.4 (*)     All other components within normal limits   COMPREHENSIVE METABOLIC PANEL W/ REFLEX TO MG FOR LOW K - Abnormal; Notable for the following components:    Sodium 133 (*)     Chloride 97 (*)     Glucose 202 (*)     Creatinine <0.5 (*)     AST 10 (*)     All other components within normal limits   URINALYSIS WITH REFLEX TO CULTURE   LIPASE   BASIC METABOLIC PANEL W/ REFLEX TO MG FOR LOW K   CBC WITH AUTO DIFFERENTIAL   POCT GLUCOSE   POCT GLUCOSE       When ordered, only abnormal lab results are displayed. All other labs were within normal range or not returned as of this dictation. EKG: When ordered, EKG's are interpreted by the Emergency Department Physician in the absence of a cardiologist.  Please see their note for interpretation of EKG. RADIOLOGY:   Non-plain film images such as CT, Ultrasound and MRI are read by the radiologist. Plain radiographic images are visualized andpreliminarily interpreted by the  ED Provider with the below findings:        Interpretation perthe Radiologist below, if available at the time of this note:    CT ABDOMEN PELVIS W IV CONTRAST Additional Contrast? None   Final Result   1. Small bowel obstruction with somewhat gradual transition point in the   distal ileum. Fecalization of contents within distal ileal bowel loops is   compatible with slow transit.       2.  Moderate colonic stool burden. 3.  There is mild gastric wall thickening and mucosal hyperenhancement which   can be seen in gastritis. Correlate with clinical symptoms. 4.  Moderately prominent lucia hepatic lymph node, nonspecific. 5.  Multiple subcentimeter hypoattenuating hepatic lesions in the context of   reported hepatic metastatic disease. CT ABDOMEN PELVIS W IV CONTRAST Additional Contrast? None    Result Date: 9/9/2022  EXAMINATION: CT OF THE ABDOMEN AND PELVIS WITH CONTRAST 9/9/2022 8:16 pm TECHNIQUE: CT of the abdomen and pelvis was performed with the administration of intravenous contrast. Multiplanar reformatted images are provided for review. Automated exposure control, iterative reconstruction, and/or weight based adjustment of the mA/kV was utilized to reduce the radiation dose to as low as reasonably achievable. COMPARISON: None. HISTORY: ORDERING SYSTEM PROVIDED HISTORY: metastatic disease with upper abd pain, nause and constipation TECHNOLOGIST PROVIDED HISTORY: Reason for exam:->metastatic disease with upper abd pain, nause and constipation Additional Contrast?->None Decision Support Exception - unselect if not a suspected or confirmed emergency medical condition->Emergency Medical Condition (MA) Reason for Exam: metastatic disease with upper abd pain, nause and constipation FINDINGS: Lower Chest: Dependent hypoventilatory changes. The heart is within normal limits of size. Coronary artery calcifications. Organs: There are scattered subcentimeter hypoattenuating hepatic lesions which are too small to accurately characterize. The main portal vein is patent. Prior cholecystectomy. Calcified splenic granulomas. The pancreas is unremarkable. Nonspecific thickening of the left adrenal gland. The right adrenal gland is unremarkable. Bilateral renal cysts and smaller hypoattenuating lesions which are too small to accurately characterize.  There are nonobstructing renal calculi measuring up to 0.5 cm within the inferior pole of left kidney. No hydronephrosis or evidence of obstructive uropathy. GI/Bowel: There is mild gastric wall thickening and mucosal hyperenhancement which can be seen in gastritis. Multiple loops of dilated distal small bowel are present with associated gas fluid levels. There is fecalization of contents within distal ileal bowel loops compatible with slow transit. A somewhat gradual transition point is seen within the distal ileum (for example coronal images ). Normal appendix. Moderate colonic stool burden. Pelvis: The bladder is unremarkable. Prostatomegaly. Peritoneum/Retroperitoneum: A moderately prominent lucia hepatic lymph node measures 1.4 cm (series 2, image 42). Otherwise, no retroperitoneal, mesenteric, or pelvic lymphadenopathy. No free fluid or pneumoperitoneum. Severe atherosclerosis of the abdominal aorta and iliac arteries without aneurysm. Bones/Soft Tissues: No acute osseous or soft tissue abnormality. Multilevel degenerative changes of the spine. There is minimal retrolisthesis of L3 on L4.     1.  Small bowel obstruction with somewhat gradual transition point in the distal ileum. Fecalization of contents within distal ileal bowel loops is compatible with slow transit. 2.  Moderate colonic stool burden. 3.  There is mild gastric wall thickening and mucosal hyperenhancement which can be seen in gastritis. Correlate with clinical symptoms. 4.  Moderately prominent lucia hepatic lymph node, nonspecific. 5.  Multiple subcentimeter hypoattenuating hepatic lesions in the context of reported hepatic metastatic disease. PROCEDURES   Unless otherwise noted below, none     Procedures    CRITICAL CARE TIME   Sher TREVIZO CNP, am the primary clinician of record  I provided 30 minutes of non concurrent Critical Care time, excluding separately reportable procedures.   There was a high probability of clinically significant/life threatening deterioration in the patient's condition which required my urgent intervention.   This time spent assessing, reassessing patient, chart review and discussing case with other providers      CONSULTS:  IP CONSULT TO GENERAL SURGERY  IP CONSULT TO GENERAL SURGERY      EMERGENCY DEPARTMENT COURSE and DIFFERENTIAL DIAGNOSIS/MDM:   Vitals:    Vitals:    09/09/22 2115 09/09/22 2129 09/09/22 2130 09/09/22 2328   BP:    118/68   Pulse: 93 96 96 67   Resp: 17 21 17 16   Temp:    98.6 °F (37 °C)   TempSrc:    Oral   SpO2: 96% 95% 96% 93%       Patient was given thefollowing medications:  Medications   albuterol sulfate HFA (PROVENTIL;VENTOLIN;PROAIR) 108 (90 Base) MCG/ACT inhaler 2 puff (has no administration in time range)   allopurinol (ZYLOPRIM) tablet 300 mg (has no administration in time range)   atorvastatin (LIPITOR) tablet 10 mg (has no administration in time range)   mometasone-formoterol (DULERA) 200-5 MCG/ACT inhaler 2 puff (has no administration in time range)   morphine (MS CONTIN) extended release tablet 30 mg (has no administration in time range)   oxyCODONE (ROXICODONE) immediate release tablet 5 mg (has no administration in time range)   sodium chloride flush 0.9 % injection 5-40 mL (has no administration in time range)   sodium chloride flush 0.9 % injection 5-40 mL (has no administration in time range)   0.9 % sodium chloride infusion (has no administration in time range)   enoxaparin (LOVENOX) injection 40 mg (has no administration in time range)   ondansetron (ZOFRAN-ODT) disintegrating tablet 4 mg (has no administration in time range)     Or   ondansetron (ZOFRAN) injection 4 mg (has no administration in time range)   polyethylene glycol (GLYCOLAX) packet 17 g (has no administration in time range)   acetaminophen (TYLENOL) tablet 650 mg (has no administration in time range)     Or   acetaminophen (TYLENOL) suppository 650 mg (has no administration in time range)   glucose chewable tablet 16 g (has no administration in time range)   dextrose bolus 10% 125 mL (has no administration in time range)     Or   dextrose bolus 10% 250 mL (has no administration in time range)   glucagon (rDNA) injection 1 mg (has no administration in time range)   dextrose 10 % infusion (has no administration in time range)   lactated ringers infusion (has no administration in time range)   insulin lispro (HUMALOG) injection vial 0-4 Units (has no administration in time range)   insulin lispro (HUMALOG) injection vial 0-4 Units (has no administration in time range)   ondansetron (ZOFRAN) injection 4 mg (4 mg IntraVENous Given 9/9/22 2100)   HYDROmorphone (DILAUDID) injection 0.5 mg (0.5 mg IntraVENous Given 9/9/22 2100)   lactated ringers bolus (0 mLs IntraVENous Stopped 9/9/22 2213)   iopamidol (ISOVUE-370) 76 % injection 75 mL (75 mLs IntraVENous Given 9/9/22 2031)     ED Course as of 09/09/22 2328   Fri Sep 09, 2022   2137 D/w Dr Cam Palmer. NPO, admit to medicine. [KM]      ED Course User Index  [KM] Katie Marie, APRN - CNP      Is this patient to be included in the SEP-1 Core Measure due to severe sepsis or septic shock? No   Exclusion criteria - the patient is NOT to be included for SEP-1 Core Measure due to:  2+ SIRS criteria are not met    Differential diagnosis: Bowel obstruction, constipation, obstipation, UTI, ileus, abdominal aortic aneurysm, other    66-year-old with abdominal pain constipation as above. Labs: Urinalysis no infection. CBC with minimal leukocytosis 11.1 with ANC of 8.4. No anemia. CMP with sodium 133, glucose 202, chloride 97. Renal function normal limits. No transaminitis. Lipase within normal limits. CT abdomen pelvis with contrast was obtained given patient presentation. Results are as above. Concerning for SBO. Patient given fluids, pain medications in ER with good symptom resolution    General surgery consulted for evaluation.   Medicine service consulted for admission    We discussed smoking cessation for roughly 3 minutes    We discussed goals of care for roughly 3 minutes. Full code. I am the Primary Clinician of Record. FINAL IMPRESSION      1. Small bowel obstruction ([facility] [address]    2. Malignant neoplasm of liver, unspecified liver malignancy type ([facility] [address]    3. Smoker          DISPOSITION/PLAN   DISPOSITION Admitted 09/09/2022 09:30:01 PM      PATIENT REFERREDTO:  No follow-up provider specified.     DISCHARGE MEDICATIONS:  Current Discharge Medication List          DISCONTINUED MEDICATIONS:  Current Discharge Medication List                 (Please note that portions ofthis note were completed with a voice recognition program.  Efforts were made to edit the dictations but occasionally words are mis-transcribed.)    GRABIEL Quach CNP (electronically signed)             GRABIEL Quach CNP  09/09/22 [id number]

## 2022-09-10 NOTE — ED PROVIDER NOTES
Date of evaluation: 9/9/2022    ED Attending Attestation Note     CHIEF COMPLAINT     Abdominal pain since yesterday, worse today  HISTORY OF PRESENT ILLNESS  (Location/Symptom, Timing/Onset,Context/Setting, Quality, Duration, Modifying Factors, Severity). Note limiting factors. This patient was seen by the advance practice provider. I have seen and examined the patient, agree with the workup, evaluation, management and diagnosis. The care plan has been discussed. Chief Complaint   Patient presents with    Abdominal Pain     Abdominal pain that started yesterday. Emesis 1 yesterday. No appetite. 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 Maria R Adkins is a 67 y.o. male who presents to the emergency department secondary to concern for abdominal pain. History of lung cancer, has completed treatment (radiation) now on chemo. Complicated by mets to brain and liver. Nausea, one episode of NB vomiting. Last BM two days ago. No Past medical history noted below: but has history as noted above. Social History     Socioeconomic History    Marital status:      Spouse name: None    Number of children: None    Years of education: None    Highest education level: None   Tobacco Use    Smoking status: Every Day     Packs/day: 1.00     Years: 50.00     Pack years: 50.00     Types: Cigarettes    Smokeless tobacco: Current     Aside from what is stated above denies any other symptoms or modifying factors. Nursing Notes reviewed. History reviewed. No pertinent surgical history. History reviewed. No pertinent family history. CURRENT MEDICATIONS       Previous Medications    No medications on file      DIAGNOSTIC RESULTS   RADIOLOGY:   Interpretation per Radiologist below, if available at the time of this note:  CT ABDOMEN PELVIS W IV CONTRAST Additional Contrast? None   Final Result   1. Small bowel obstruction with somewhat gradual transition point in the   distal ileum.   Fecalization of he is currently a full code and would want both CPR and intubation if needed. Critical Care:  I personally saw the patient and independently provided 8 minutes of nonconcurrent critical care out of the total shared critical care time provided. Due to the immediate potential for life-threatening deterioration due to small bowel obstruction requiring specialist evaluation and potential intervention, I spent 8 minutes providing critical care. This time excludes time spent performing procedures but includes time spent on direct patient care, history retrieval, review of the chart, and discussions with patient, family, and consultant(s). FINAL IMPRESSION      1. Small bowel obstruction ([address]    2. Malignant neoplasm of liver, unspecified liver malignancy type ([address]    3. Smoker    4.  Goals of care, counseling/discussion        DISPOSITION/PLAN   DISPOSITION Admitted 09/09/2022 09:30:01 PM      (Please note that portions of this note were completed with a voice recognition program. Efforts were made to edit the dictations but occasionally words are mis-transcribed.)    Consuelo Odell MD (electronically signed)  Attending Emergency Physician       Consuelo Odell MD  09/09/22 0126

## 2022-09-10 NOTE — PROGRESS NOTES
Patient was admitted to room 3262. Patient oriented to room and taught how to use call light. Patient is resting comfortably in bed. Will continue to monitor.

## 2022-09-10 NOTE — PROGRESS NOTES
Pt admitted with small bowel obstruction on 9/9/22. Pt A&A, but agitated at times. Pt requested nicotine patch. Patch ordered and placed on back. Diet advanced to clears. Denies pain currently. Bowel sounds active x 4. PIV in R forearm with continuous LR running at 100 ml/hr. Pt reports no BM for 3-4 days. Will monitor bowel activity. Pt can make needs known. Call light w/in reach.

## 2022-09-10 NOTE — H&P
Hospital Medicine History & Physical      PCP: LESA CABA [facility]    Date of Admission: 2022    Date of Service: Pt seen/examined on 2022 and Admitted to Inpatient     Chief Complaint:  abdominal pain      History Of Present Illness: The patient is a 67 y.o. 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 Poornima Harrington: completed radiation, now receiving chemo  PCP: [facility]: Altagracia Altman MD    Patient presented to the emergency department with complaints of generalized abdominal pain and distention with 1 episode of vomiting and a couple of episodes of watery diarrhea but no formed stool. Emesis x1 was nonbloody nonbilious. LBM: 3 days    ED workup: WBC 11 with a left shift of 8.4. Metabolic panel: Glucose 771. CT abdomen and pelvis indicating small bowel obstruction at the ileum with a transition point. General surgery was consulted from the ED: Dr. Rosita Brothers. Continue with n.p.o. status no NG tube necessary unless vomiting. Will see patient in a.m. On my exam: Patient is pleasant, awake and alert. He was actually ambulating in the hallway coming back from the bathroom. Shortness of breath and conversational dyspnea noted however patient states that happens to be his normal.  He is not oxygen dependent. Does use inhalers. He is asking if he can go out and smoke. He is cooperative and he is pleasant. Chest positive for wheezing, decreased breath sounds throughout. Abdomen is soft but distended. No bowel sounds. No rebound or guarding.   Surgical scar consistent with surgical history        Past Medical History:    History reviewed. No pertinent past medical history. Past Surgical History:    History reviewed. No pertinent surgical history. Medications Prior to Admission:    Prior to Admission medications    Medication Sig Start Date End Date Taking? Authorizing Provider   JANUVIA 100 MG tablet TAKE 1 TABLET BY MOUTH DAILY FOR DIABETES 8/18/22   Historical Provider, MD   pioglitazone (ACTOS) 45 MG tablet Take 1 tablet by mouth daily 9/7/22   Historical Provider, MD   oxyCODONE (ROXICODONE) 5 MG immediate release tablet TAKE 1 TO 2 TABLETS BY MOUTH EVERY 6 HOURS AS NEEDED FOR SEVERE PAIN 8/17/22   Historical Provider, MD   morphine (MS CONTIN) 30 MG extended release tablet TAKE 1 TABLET BY MOUTH EVERY 12 HOURS 9/7/22   Historical Provider, MD   metFORMIN (GLUCOPHAGE) 1000 MG tablet TAKE 1 TABLET BY MOUTH TWICE DAILY WITH MEALS 8/17/22   Historical Provider, MD   glipiZIDE (GLUCOTROL) 10 MG tablet Take 1 tablet by mouth in the morning and at bedtime 9/7/22   Historical Provider, MD   budesonide-formoterol (SYMBICORT) 160-4.5 MCG/ACT AERO Inhale 2 puffs into the lungs in the morning and at bedtime 9/7/22   Historical Provider, MD   atorvastatin (LIPITOR) 10 MG tablet Take 10 mg by mouth daily 8/31/22   Historical Provider, MD   allopurinol (ZYLOPRIM) 300 MG tablet TAKE 1 TABLET BY MOUTH DAILY 7/18/22   Historical Provider, MD   albuterol sulfate HFA (PROVENTIL;VENTOLIN;PROAIR) 108 (90 Base) MCG/ACT inhaler Inhale 1-2 puffs into the lungs every 4-6 hours as needed 9/7/22   Historical Provider, MD       Allergies:  Patient has no known allergies. Social History:  The patient currently lives lives at home. TOBACCO:   reports that he has been smoking cigarettes. He has a 50.00 pack-year smoking history. He uses smokeless tobacco.  ETOH:   has no history on file for alcohol use. Family History:  Reviewed in detail and negative for DM, Early CAD, Cancer, CVA.  Positive as follows:    History reviewed. No pertinent family history. REVIEW OF SYSTEMS:   and as noted in the HPI. All other systems reviewed and negative. PHYSICAL EXAM:    /68   Pulse 67   Temp 98.6 °F (37 °C) (Oral)   Resp 16   SpO2 93%     General appearance: No apparent distress appears stated age and cooperative. HEENT Normal cephalic, atraumatic without obvious deformity. Pupils equal, round, and reactive to light. Extra ocular muscles intact. Conjunctivae/corneas clear. Neck: Supple, No jugular venous distention/bruits. Trachea midline without thyromegaly or adenopathy with full range of motion. Lungs: Clear to auscultation, bilaterally without Rales/Wheezes/Rhonchi with good respiratory effort. Heart: Regular rate and rhythm with Normal S1/S2 without murmurs, rubs or gallops, point of maximum impulse non-displaced  Abdomen: Soft, non-tender or non-distended without rigidity or guarding and positive bowel sounds all four quadrants. Extremities: No clubbing, cyanosis, or edema bilaterally. Full range of motion without deformity and normal gait intact. Skin: Skin color, texture, turgor normal.  No rashes or lesions. Neurologic: Alert and oriented X 3, neurovascularly intact with sensory/motor intact upper extremities/lower extremities, bilaterally. Cranial nerves: II-XII intact, grossly non-focal.  Mental status: Alert, oriented, thought content appropriate.   Capillary Refill: Acceptable  < 3 seconds  Peripheral Pulses: +3 Easily felt, not easily obliterated with pressure      CXR:  I have reviewed the CXR with the following interpretation: N/A  EKG:  I have reviewed the EKG with the following interpretation: N/A    CBC   Recent Labs     09/09/22 1935   WBC 11.1*   HGB 14.4   HCT 43.0         RENAL  Recent Labs     09/09/22 1935   *   K 4.1   CL 97*   CO2 25   BUN 10   CREATININE <0.5*     LFT'S  Recent Labs     09/09/22 1935   AST 10*   ALT 12   BILITOT <0.2   ALKPHOS 112 COAG  No results for input(s): INR in the last 72 hours. CARDIAC ENZYMES  No results for input(s): CKTOTAL, CKMB, CKMBINDEX, TROPONINI in the last 72 hours. U/A:    Lab Results   Component Value Date/Time    COLORU Yellow 09/09/2022 09:13 PM    CLARITYU Clear 09/09/2022 09:13 PM    SPECGRAV 1.060 09/09/2022 09:13 PM    LEUKOCYTESUR Negative 09/09/2022 09:13 PM    BLOODU Negative 09/09/2022 09:13 PM    GLUCOSEU Negative 09/09/2022 09:13 PM       ABG  No results found for: KDO6IMR, BEART, Q4WPYMYL, PHART, THGBART, RLS6RYE, PO2ART, IUD7XPW        Active Hospital Problems    Diagnosis Date Noted    SBO (small bowel obstruction) ([facility] [address] [K56.609] 09/09/2022     Priority: Medium           PHYSICIANS CERTIFICATION:    I certify that Jie Arauz is expected to be hospitalized for more than 2 midnights based on the following assessment and plan:      ASSESSMENT/PLAN:  SBO: As evidenced on CT  General surgery consulted: Dr. Kandis Xie will follow in a.m.-> n.p.o. except sips with meds  No NG necessary unless vomiting begins  CBC 11.1    DM: Accu-Cheks every 4 hours, hypoglycemic protocol, n.p.o. except sips with meds  for now, SSI  Hold home regimen    NS CL with brain mets and liver mets: [facility] is hematology oncology  Continue MS IR and Stanford per home regimen for pain    Tobacco dependence: Patient counseled  Nicotine patch    Emphysema/COPD: Not oxygen dependent  Continue home inhaler regimen  Oxygen therapy to maintain saturation greater than 90      DVT Prophylaxis: Lovenox  Diet: Diet NPO Exceptions are: Sips of Water with Meds  Code Status: Full Code  PT/OT Eval Status: Independent    Dispo -admit, inpatient       Harry Mancuso, APRN - CNP    Thank you LESA LOPEZ for the opportunity to be involved in this patient's care. If you have any questions or concerns please feel free to contact me at [phone].     This dictation was performed with a verbal recognition program (DRAGON) and it was checked for errors. It is possible that there are still dictated errors within this office note. If so, please bring any errors to my attention for an addendum. All efforts were made to ensure that this office note is accurate.

## 2022-09-10 NOTE — PROGRESS NOTES
4 Eyes Skin Assessment     NAME:  Richardine Severs  YOB: 1950  MEDICAL RECORD NUMBER:  [id number]    The patient is being assess for  Admission    I agree that 2 RN's have performed a thorough Head to Toe Skin Assessment on the patient. ALL assessment sites listed below have been assessed. Areas assessed by both nurses:    Head, Face, Ears, Shoulders, Back, Chest, Arms, Elbows, Hands, Sacrum. Buttock, Coccyx, Ischium, and Legs. Feet and Heels        Does the Patient have a Wound?  No noted wound(s)       Boom Prevention initiated:  No   Wound Care Orders initiated:  No    Pressure Injury (Stage 3,4, Unstageable, DTI, NWPT, and Complex wounds) if present place referral/consult order under [de-identified] No    New and Established Ostomies if present place consult order under : No      Nurse 1 eSignature: Electronically signed by Ric Rzizo RN on 9/10/22 at 7:23 AM EDT    **SHARE this note so that the co-signing nurse is able to place an eSignature**    Nurse 2 eSignature: Electronically signed by Juan Argueta RN on 9/10/22 at 10:11 AM EDT

## 2022-09-11 VITALS
WEIGHT: 164.02 LBS | TEMPERATURE: 98.5 F | DIASTOLIC BLOOD PRESSURE: 73 MMHG | SYSTOLIC BLOOD PRESSURE: 136 MMHG | BODY MASS INDEX: 21.74 KG/M2 | RESPIRATION RATE: 18 BRPM | HEART RATE: 83 BPM | HEIGHT: 73 IN | OXYGEN SATURATION: 100 %

## 2022-09-11 LAB
ANION GAP SERPL CALCULATED.3IONS-SCNC: 13 MMOL/L (ref 3–16)
BASOPHILS ABSOLUTE: 0 K/UL (ref 0–0.2)
BASOPHILS RELATIVE PERCENT: 0.1 %
BUN BLDV-MCNC: 8 MG/DL (ref 7–20)
CALCIUM SERPL-MCNC: 9.4 MG/DL (ref 8.3–10.6)
CHLORIDE BLD-SCNC: 99 MMOL/L (ref 99–110)
CO2: 25 MMOL/L (ref 21–32)
CREAT SERPL-MCNC: 0.6 MG/DL (ref 0.8–1.3)
EOSINOPHILS ABSOLUTE: 0.1 K/UL (ref 0–0.6)
EOSINOPHILS RELATIVE PERCENT: 2.2 %
GFR AFRICAN AMERICAN: >60
GFR NON-AFRICAN AMERICAN: >60
GLUCOSE BLD-MCNC: 199 MG/DL (ref 70–99)
GLUCOSE BLD-MCNC: 236 MG/DL (ref 70–99)
GLUCOSE BLD-MCNC: 254 MG/DL (ref 70–99)
HCT VFR BLD CALC: 40.5 % (ref 40.5–52.5)
HEMOGLOBIN: 13.4 G/DL (ref 13.5–17.5)
LYMPHOCYTES ABSOLUTE: 1.5 K/UL (ref 1–5.1)
LYMPHOCYTES RELATIVE PERCENT: 24.5 %
MCH RBC QN AUTO: 30.1 PG (ref 26–34)
MCHC RBC AUTO-ENTMCNC: 33.1 G/DL (ref 31–36)
MCV RBC AUTO: 91.1 FL (ref 80–100)
MONOCYTES ABSOLUTE: 0.6 K/UL (ref 0–1.3)
MONOCYTES RELATIVE PERCENT: 9.2 %
NEUTROPHILS ABSOLUTE: 4 K/UL (ref 1.7–7.7)
NEUTROPHILS RELATIVE PERCENT: 64 %
PDW BLD-RTO: 15.1 % (ref 12.4–15.4)
PERFORMED ON: ABNORMAL
PERFORMED ON: ABNORMAL
PLATELET # BLD: 238 K/UL (ref 135–450)
PMV BLD AUTO: 7.8 FL (ref 5–10.5)
POTASSIUM REFLEX MAGNESIUM: 4.1 MMOL/L (ref 3.5–5.1)
RBC # BLD: 4.45 M/UL (ref 4.2–5.9)
SODIUM BLD-SCNC: 137 MMOL/L (ref 136–145)
WBC # BLD: 6.3 K/UL (ref 4–11)

## 2022-09-11 PROCEDURE — 2580000003 HC RX 258: Performed by: NURSE PRACTITIONER

## 2022-09-11 PROCEDURE — 6370000000 HC RX 637 (ALT 250 FOR IP): Performed by: NURSE PRACTITIONER

## 2022-09-11 PROCEDURE — 6370000000 HC RX 637 (ALT 250 FOR IP): Performed by: SURGERY

## 2022-09-11 PROCEDURE — 6360000002 HC RX W HCPCS: Performed by: NURSE PRACTITIONER

## 2022-09-11 PROCEDURE — 85025 COMPLETE CBC W/AUTO DIFF WBC: CPT

## 2022-09-11 PROCEDURE — 6370000000 HC RX 637 (ALT 250 FOR IP): Performed by: PHYSICIAN ASSISTANT

## 2022-09-11 PROCEDURE — 99232 SBSQ HOSP IP/OBS MODERATE 35: CPT | Performed by: SURGERY

## 2022-09-11 PROCEDURE — 36415 COLL VENOUS BLD VENIPUNCTURE: CPT

## 2022-09-11 PROCEDURE — 94760 N-INVAS EAR/PLS OXIMETRY 1: CPT

## 2022-09-11 PROCEDURE — 80048 BASIC METABOLIC PNL TOTAL CA: CPT

## 2022-09-11 RX ORDER — DOCUSATE SODIUM 100 MG/1
100 CAPSULE, LIQUID FILLED ORAL 2 TIMES DAILY
Status: DISCONTINUED | OUTPATIENT
Start: 2022-09-11 | End: 2022-09-11 | Stop reason: HOSPADM

## 2022-09-11 RX ADMIN — ATORVASTATIN CALCIUM 10 MG: 10 TABLET, FILM COATED ORAL at 09:34

## 2022-09-11 RX ADMIN — ENOXAPARIN SODIUM 40 MG: 100 INJECTION SUBCUTANEOUS at 09:33

## 2022-09-11 RX ADMIN — SODIUM CHLORIDE, POTASSIUM CHLORIDE, SODIUM LACTATE AND CALCIUM CHLORIDE: 600; 310; 30; 20 INJECTION, SOLUTION INTRAVENOUS at 09:24

## 2022-09-11 RX ADMIN — DOCUSATE SODIUM 100 MG: 100 CAPSULE, LIQUID FILLED ORAL at 11:37

## 2022-09-11 RX ADMIN — INSULIN LISPRO 1 UNITS: 100 INJECTION, SOLUTION INTRAVENOUS; SUBCUTANEOUS at 11:36

## 2022-09-11 RX ADMIN — MORPHINE SULFATE 30 MG: 30 TABLET, FILM COATED, EXTENDED RELEASE ORAL at 09:34

## 2022-09-11 RX ADMIN — ALLOPURINOL 300 MG: 300 TABLET ORAL at 09:34

## 2022-09-11 NOTE — CARE COORDINATION
CASE MANAGEMENT DISCHARGE SUMMARY: No Needs    DISCHARGE DATE: 9/11/22    DISCHARGED TO HOME     TRANSPORTATION: family/friends                 Electronically signed by ALBERTO Stubbs on 9/11/2022 at 4:45 PM                 [phone]

## 2022-09-11 NOTE — PLAN OF CARE
Problem: Pain  Goal: Verbalizes/displays adequate comfort level or baseline comfort level  9/11/2022 1629 by Michael Walters RN  Outcome: Adequate for Discharge  9/11/2022 1301 by Michael Walters RN  Outcome: Progressing     Problem: Safety - Adult  Goal: Free from fall injury  9/11/2022 1629 by Michael Walters RN  Outcome: Adequate for Discharge  9/11/2022 1301 by Michael Walters RN  Outcome: Progressing  Flowsheets (Taken 9/11/2022 1259)  Free From Fall Injury: Instruct family/caregiver on patient safety     Problem: ABCDS Injury Assessment  Goal: Absence of physical injury  9/11/2022 1629 by Michael Walters RN  Outcome: Adequate for Discharge  9/11/2022 1301 by Michael Walters RN  Outcome: Progressing  Flowsheets (Taken 9/11/2022 1259)  Absence of Physical Injury: Implement safety measures based on patient assessment

## 2022-09-11 NOTE — PROGRESS NOTES
Bedside nurse called states patient angry and states leaving now, this nurse explained no dc order, cursing at this nurse.

## 2022-09-11 NOTE — PROGRESS NOTES
Pt prepared for d/c. Belongings gathered. IV removed. Care plan and education resolved. Called daughter, Christian Mullen, to go over d/c and f/u instructions. She denied further questions. Went over d/c instructions and f/up with patient. Pt denies further questions. Educated pt to notify PCP or return to ER for worsening GI symptoms and to f/u with PCP w/in one week. D/c instructions signed by patient. Patient taken off unit in wheelchair by CNA @ [phone].

## 2022-09-11 NOTE — PROGRESS NOTES
Pt awake and AAO sitting up in bed watching TV. Pt admitted with SBO. Pt does have h/o cancer with chemo. Pt denies pain. Pt reports that, \" if they aren't going to do nothing tomorrow, then I am leaving, I cant smoke or eat, I'm going home. \" Explained to pt that he is on clears to help his belly rest. Belly flat and soft with active BS. Pt reports he had a BM today. Pt UAL. Continent B and B. Denies abd pain or nausea. Lungs decreased on posterior ausc. No sob. On RA. Occasional moist and congested nonproductive cough. No edema noted. R FA PIV with LR @ 100ml/hr. Call light in reach.

## 2022-09-11 NOTE — PROGRESS NOTES
General Surgery  Daily Progress Note    Pt Name: Dee Dee Love  Medical Record Number: [id number]  Date of Birth 1950   Today's Date: 9/11/2022    Chief Complaint   Patient presents with    Abdominal Pain     Abdominal pain that started yesterday. Emesis 1 yesterday. No appetite. 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 ASSESSMENT/PLAN  Ileus vs gastroenteritis, less likely partial small bowel obstruction - symptoms resolved. Tolerating clears. +BM. Will advance to regular diet. Ok to discharge home if tolerating regular diet without return of symptoms  Tobacco abuse - continue nicotine patch  DM, NSCLC - per primary team       Vera Engel has improved from yesterday. He denies any abdominal pain. He is tolerating clears. He has no nausea and no vomiting. He has passed flatus and has had a bowel movement. Current activity is ad daniel    OBJECTIVE  VITALS:  height is 6' 1\" (1.854 m) and weight is 164 lb 0.4 oz (74.4 kg). His oral temperature is 97.6 °F (36.4 °C). His blood pressure is 124/76 and his pulse is 58. His respiration is 18 and oxygen saturation is 94%. GENERAL: NAD, alert and oriented  LUNGS: normal respiratory effort, no accessory muscle use  HEART: normal rate and regular rhythm  ABDOMEN: soft, ND, NT  EXTREMITY: no cyanosis and no clubbing  I/O last 3 completed shifts: In: 1502 [I.V.:1502]  Out: -   No intake/output data recorded.     LABS  Recent Labs     09/09/22  1935 09/09/22  2113 09/10/22  0837   WBC 11.1*  --  10.0   HGB 14.4  --  14.7   HCT 43.0  --  44.3     --  266   *  --  139   K 4.1  --  4.4   CL 97*  --  101   CO2 25  --  25   BUN 10  --  11   CREATININE <0.5*  --  0.6*   CALCIUM 9.6  --  9.8   AST 10*  --   --    ALT 12  --   --    BILITOT <0.2  --   --    NITRU  --  Negative  --    COLORU  --  Yellow  --    CBC with Differential:    Lab Results   Component Value Date/Time    WBC 10.0 09/10/2022 08:37 AM    RBC 4.82 09/10/2022 08:37 AM HGB 14.7 09/10/2022 08:37 AM    HCT 44.3 09/10/2022 08:37 AM     09/10/2022 08:37 AM    MCV 92.0 09/10/2022 08:37 AM    MCH 30.5 09/10/2022 08:37 AM    MCHC 33.2 09/10/2022 08:37 AM    RDW 15.1 09/10/2022 08:37 AM    LYMPHOPCT 21.6 09/10/2022 08:37 AM    MONOPCT 8.9 09/10/2022 08:37 AM    BASOPCT 0.1 09/10/2022 08:37 AM    MONOSABS 0.9 09/10/2022 08:37 AM    LYMPHSABS 2.2 09/10/2022 08:37 AM    EOSABS 0.1 09/10/2022 08:37 AM    BASOSABS 0.0 09/10/2022 08:37 AM     BMP:    Lab Results   Component Value Date/Time     09/10/2022 08:37 AM    K 4.4 09/10/2022 08:37 AM     09/10/2022 08:37 AM    CO2 25 09/10/2022 08:37 AM    BUN 11 09/10/2022 08:37 AM    LABALBU 4.3 09/09/2022 07:35 PM    CREATININE 0.6 09/10/2022 08:37 AM    CALCIUM 9.8 09/10/2022 08:37 AM    GFRAA >60 09/10/2022 08:37 AM    LABGLOM >60 09/10/2022 08:37 AM    GLUCOSE 166 09/10/2022 08:37 AM         Rudolph Faustin MD  Electronically signed 9/11/2022 at 10:57 AM

## 2022-09-11 NOTE — PROGRESS NOTES
Pt becoming extremely agitated around 1500 this afternoon because he says \"we aren't doing anything\" and he believes he was told he could go home after lunch if he could handle advancing his diet. Contacted Dr. Kristan Lewis and she said that she told him explicitly that he would need to eat dinner. Pt cursed at charged nurse who tried to clarify the situation. Called pt's daughter, Kurt Harry, to let her know that pt was becoming agitated and was threatening to leave AMA. Daughter stated that she would call her dad and try to calm him down.

## 2022-09-11 NOTE — PROGRESS NOTES
Pt admitted with small bowel obstruction on 9/9/22. Pt A&A, Diet advanced to reg. Denies Abd pain. Bowel sounds active x4. PIV in R forearm with continuous LR running at 50 ml/hr. Pt had large formed/soft brown BM this morning. Will monitor bowel activity. Pt can make needs known. Call light w/in reach.

## 2022-09-14 NOTE — DISCHARGE SUMMARY
Hospital Medicine Discharge Summary    Patient ID: Maria R Adkins      Patient's PCP: LESA Al    Admit Date: 9/9/2022     Discharge Date: 9/11/2022      Admitting Physician: Mu Pierre DO     Discharge Physician: Curt Rogers MD     Discharge Diagnoses: Active Hospital Problems    Diagnosis Date Noted    Tobacco abuse [Z72.0] 09/10/2022     Priority: Medium    Metastatic squamous cell carcinoma ([facility] [address] [C79.9] 09/10/2022     Priority: Medium    Type 2 diabetes mellitus without complication, without long-term current use of insulin ([facility] [address] [E11.9] 09/10/2022     Priority: Medium    Small bowel obstruction [facility]) [K56.609] 09/09/2022     Priority: Medium       The patient was seen and examined on day of discharge and this discharge summary is in conjunction with any daily progress note from day of discharge. Hospital Course:     Ileus vs enteritis  Potential SBO:  improved  -Mild and appears to be resolving spontaneously  -Seen by general surgery -- could have been ileus or enteritis  - tolering full diet at time of d/c     DM: Accu-Cheks every 4 hours, hypoglycemic protocol, n.p.o. except sips with meds  for now, SSI  Hold home regimen     NS CL with brain mets and liver mets: [facility] is hematology oncology  Continue MS MARCELO and Stanford per home regimen for pain     Tobacco dependence: Patient counseled  Nicotine patch     Emphysema/COPD: Not oxygen dependent  Continue home inhaler regimen  Oxygen therapy to maintain saturation greater than 90         Exam:     /73   Pulse 83   Temp 98.5 °F (36.9 °C) (Oral)   Resp 18   Ht 6' 1\" (1.854 m)   Wt 164 lb 0.4 oz (74.4 kg)   SpO2 100%   BMI 21.64 kg/m²     General appearance: No apparent distress, appears stated age and cooperative. HEENT: Pupils equal, round, and reactive to light. Conjunctivae/corneas clear. Neck: Supple, with full range of motion. No jugular venous distention. Trachea midline.   Respiratory:  Normal respiratory effort. Clear to auscultation, bilaterally without Rales/Wheezes/Rhonchi. Cardiovascular: Regular rate and rhythm with normal S1/S2 without murmurs, rubs or gallops. Abdomen: Soft, non-tender, non-distended with normal bowel sounds. Musculoskelatal: No clubbing, cyanosis or edema bilaterally. Full range of motion without deformity. Skin: Skin color, texture, turgor normal.  No rashes or lesions. Neurologic:  Neurovascularly intact without any focal sensory/motor deficits. Cranial nerves: II-XII intact, grossly non-focal.  Psychiatric: Alert and oriented, thought content appropriate, normal insight      Consults:     IP CONSULT TO GENERAL SURGERY  IP CONSULT TO GENERAL SURGERY    Significant Diagnostic Studies:       Radiology:  CT ABDOMEN PELVIS W IV CONTRAST Additional Contrast? None   Final Result   1. Small bowel obstruction with somewhat gradual transition point in the   distal ileum. Fecalization of contents within distal ileal bowel loops is   compatible with slow transit. 2.  Moderate colonic stool burden. 3.  There is mild gastric wall thickening and mucosal hyperenhancement which   can be seen in gastritis. Correlate with clinical symptoms. 4.  Moderately prominent lucia hepatic lymph node, nonspecific. 5.  Multiple subcentimeter hypoattenuating hepatic lesions in the context of   reported hepatic metastatic disease. PCP/SNF to follow up: F/u chronic conditions    Disposition:  Home    Condition on D/C:  Stable     Discharge Instructions/Follow-up:  F/u with PCP within 1 week    Code Status:  Prior     Activity: activity as tolerated    Diet: cardiac diet    Labs:  For convenience and continuity at follow-up the following most recent labs are provided:      CBC:    Lab Results   Component Value Date/Time    WBC 6.3 09/11/2022 11:48 AM    HGB 13.4 09/11/2022 11:48 AM    HCT 40.5 09/11/2022 11:48 AM     09/11/2022 11:48 AM       Renal:    Lab Results Component Value Date/Time     09/11/2022 11:48 AM    K 4.1 09/11/2022 11:48 AM    CL 99 09/11/2022 11:48 AM    CO2 25 09/11/2022 11:48 AM    BUN 8 09/11/2022 11:48 AM    CREATININE 0.6 09/11/2022 11:48 AM    CALCIUM 9.4 09/11/2022 11:48 AM       Discharge Medications:     Discharge Medication List as of 9/11/2022  5:18 PM             Details   JANUVIA 100 MG tablet Take 100 mg by mouth daily, DAWHistorical Med      pioglitazone (ACTOS) 45 MG tablet Take 45 mg by mouth dailyHistorical Med      oxyCODONE (ROXICODONE) 5 MG immediate release tablet Take 5-10 mg by mouth every 6 hours as needed for Pain. Historical Med      morphine (MS CONTIN) 30 MG extended release tablet Take 30 mg by mouth 2 times daily. Historical Med      metFORMIN (GLUCOPHAGE) 1000 MG tablet Take 1,000 mg by mouth 2 times daily (with meals)Historical Med      glipiZIDE (GLUCOTROL) 10 MG tablet Take 10 mg by mouth 2 times daily (before meals)Historical Med      budesonide-formoterol (SYMBICORT) 160-4.5 MCG/ACT AERO Inhale 2 puffs into the lungs in the morning and at bedtimeHistorical Med      atorvastatin (LIPITOR) 10 MG tablet Take 10 mg by mouth dailyHistorical Med      allopurinol (ZYLOPRIM) 300 MG tablet Take 300 mg by mouth dailyHistorical Med      albuterol sulfate HFA (PROVENTIL;VENTOLIN;PROAIR) 108 (90 Base) MCG/ACT inhaler Inhale 1-2 puffs into the lungs every 4-6 hours as neededHistorical Med             Time Spent on discharge is more than 30 minutes in the examination, evaluation, counseling and review of medications and discharge plan. Signed: Matthew Bonner MD   9/14/2022      Thank you Lexx Vigil for the opportunity to be involved in this patient's care. If you have any questions or concerns please feel free to contact me at [phone].

## 2022-12-12 ENCOUNTER — APPOINTMENT (OUTPATIENT)
Dept: CT IMAGING | Age: 72
End: 2022-12-12
Payer: MEDICARE

## 2022-12-12 ENCOUNTER — HOSPITAL ENCOUNTER (EMERGENCY)
Age: 72
Discharge: HOME | End: 2022-12-12
Attending: EMERGENCY MEDICINE
Payer: MEDICARE

## 2022-12-12 ENCOUNTER — APPOINTMENT (OUTPATIENT)
Dept: GENERAL RADIOLOGY | Age: 72
End: 2022-12-12
Payer: MEDICARE

## 2022-12-12 VITALS
RESPIRATION RATE: 19 BRPM | SYSTOLIC BLOOD PRESSURE: 116 MMHG | HEART RATE: 78 BPM | WEIGHT: 157 LBS | OXYGEN SATURATION: 97 % | DIASTOLIC BLOOD PRESSURE: 68 MMHG | TEMPERATURE: 98.3 F | BODY MASS INDEX: 20.71 KG/M2

## 2022-12-12 DIAGNOSIS — C34.90 PRIMARY MALIGNANT NEOPLASM OF LUNG METASTATIC TO OTHER SITE, UNSPECIFIED LATERALITY (HCC): ICD-10-CM

## 2022-12-12 DIAGNOSIS — S50.02XA CONTUSION OF LEFT ELBOW, INITIAL ENCOUNTER: ICD-10-CM

## 2022-12-12 DIAGNOSIS — R55 NEAR SYNCOPE: Primary | ICD-10-CM

## 2022-12-12 LAB
A/G RATIO: 1.2 (ref 1.1–2.2)
ALBUMIN SERPL-MCNC: 4 G/DL (ref 3.4–5)
ALP BLD-CCNC: 105 U/L (ref 40–129)
ALT SERPL-CCNC: 7 U/L (ref 10–40)
ANION GAP SERPL CALCULATED.3IONS-SCNC: 9 MMOL/L (ref 3–16)
AST SERPL-CCNC: 11 U/L (ref 15–37)
BASOPHILS ABSOLUTE: 0 K/UL (ref 0–0.2)
BASOPHILS RELATIVE PERCENT: 0.3 %
BILIRUB SERPL-MCNC: 0.5 MG/DL (ref 0–1)
BUN BLDV-MCNC: 14 MG/DL (ref 7–20)
CALCIUM SERPL-MCNC: 9.9 MG/DL (ref 8.3–10.6)
CHLORIDE BLD-SCNC: 100 MMOL/L (ref 99–110)
CO2: 26 MMOL/L (ref 21–32)
CREAT SERPL-MCNC: 0.6 MG/DL (ref 0.8–1.3)
EKG ATRIAL RATE: 66 BPM
EKG DIAGNOSIS: NORMAL
EKG P AXIS: 86 DEGREES
EKG P-R INTERVAL: 152 MS
EKG Q-T INTERVAL: 406 MS
EKG QRS DURATION: 92 MS
EKG QTC CALCULATION (BAZETT): 425 MS
EKG R AXIS: 85 DEGREES
EKG T AXIS: 72 DEGREES
EKG VENTRICULAR RATE: 66 BPM
EOSINOPHILS ABSOLUTE: 0.1 K/UL (ref 0–0.6)
EOSINOPHILS RELATIVE PERCENT: 0.5 %
GFR SERPL CREATININE-BSD FRML MDRD: >60 ML/MIN/{1.73_M2}
GLUCOSE BLD-MCNC: 239 MG/DL (ref 70–99)
HCT VFR BLD CALC: 41.9 % (ref 40.5–52.5)
HEMOGLOBIN: 13.8 G/DL (ref 13.5–17.5)
LYMPHOCYTES ABSOLUTE: 1.6 K/UL (ref 1–5.1)
LYMPHOCYTES RELATIVE PERCENT: 16.5 %
MCH RBC QN AUTO: 29.6 PG (ref 26–34)
MCHC RBC AUTO-ENTMCNC: 32.9 G/DL (ref 31–36)
MCV RBC AUTO: 90 FL (ref 80–100)
MONOCYTES ABSOLUTE: 0.8 K/UL (ref 0–1.3)
MONOCYTES RELATIVE PERCENT: 8.2 %
NEUTROPHILS ABSOLUTE: 7 K/UL (ref 1.7–7.7)
NEUTROPHILS RELATIVE PERCENT: 74.5 %
PDW BLD-RTO: 16.3 % (ref 12.4–15.4)
PLATELET # BLD: 346 K/UL (ref 135–450)
PMV BLD AUTO: 7.8 FL (ref 5–10.5)
POTASSIUM REFLEX MAGNESIUM: 4.3 MMOL/L (ref 3.5–5.1)
PRO-BNP: 125 PG/ML (ref 0–124)
RBC # BLD: 4.66 M/UL (ref 4.2–5.9)
SODIUM BLD-SCNC: 135 MMOL/L (ref 136–145)
TOTAL PROTEIN: 7.4 G/DL (ref 6.4–8.2)
TROPONIN: <0.01 NG/ML
WBC # BLD: 9.4 K/UL (ref 4–11)

## 2022-12-12 PROCEDURE — 94761 N-INVAS EAR/PLS OXIMETRY MLT: CPT

## 2022-12-12 PROCEDURE — 99285 EMERGENCY DEPT VISIT HI MDM: CPT

## 2022-12-12 PROCEDURE — 2580000003 HC RX 258: Performed by: PHYSICIAN ASSISTANT

## 2022-12-12 PROCEDURE — 83880 ASSAY OF NATRIURETIC PEPTIDE: CPT

## 2022-12-12 PROCEDURE — 96360 HYDRATION IV INFUSION INIT: CPT

## 2022-12-12 PROCEDURE — 93005 ELECTROCARDIOGRAM TRACING: CPT | Performed by: PHYSICIAN ASSISTANT

## 2022-12-12 PROCEDURE — 84484 ASSAY OF TROPONIN QUANT: CPT

## 2022-12-12 PROCEDURE — 85025 COMPLETE CBC W/AUTO DIFF WBC: CPT

## 2022-12-12 PROCEDURE — 36415 COLL VENOUS BLD VENIPUNCTURE: CPT

## 2022-12-12 PROCEDURE — 6370000000 HC RX 637 (ALT 250 FOR IP): Performed by: PHYSICIAN ASSISTANT

## 2022-12-12 PROCEDURE — 70450 CT HEAD/BRAIN W/O DYE: CPT

## 2022-12-12 PROCEDURE — 71045 X-RAY EXAM CHEST 1 VIEW: CPT

## 2022-12-12 PROCEDURE — 94640 AIRWAY INHALATION TREATMENT: CPT

## 2022-12-12 PROCEDURE — 93010 ELECTROCARDIOGRAM REPORT: CPT | Performed by: INTERNAL MEDICINE

## 2022-12-12 PROCEDURE — 6360000002 HC RX W HCPCS: Performed by: PHYSICIAN ASSISTANT

## 2022-12-12 PROCEDURE — 80053 COMPREHEN METABOLIC PANEL: CPT

## 2022-12-12 RX ORDER — 0.9 % SODIUM CHLORIDE 0.9 %
1000 INTRAVENOUS SOLUTION INTRAVENOUS ONCE
Status: COMPLETED | OUTPATIENT
Start: 2022-12-12 | End: 2022-12-12

## 2022-12-12 RX ORDER — ALBUTEROL SULFATE 2.5 MG/3ML
5 SOLUTION RESPIRATORY (INHALATION) ONCE
Status: COMPLETED | OUTPATIENT
Start: 2022-12-12 | End: 2022-12-12

## 2022-12-12 RX ORDER — IPRATROPIUM BROMIDE AND ALBUTEROL SULFATE 2.5; .5 MG/3ML; MG/3ML
1 SOLUTION RESPIRATORY (INHALATION) ONCE
Status: COMPLETED | OUTPATIENT
Start: 2022-12-12 | End: 2022-12-12

## 2022-12-12 RX ADMIN — IPRATROPIUM BROMIDE AND ALBUTEROL SULFATE 1 AMPULE: .5; 3 SOLUTION RESPIRATORY (INHALATION) at 14:13

## 2022-12-12 RX ADMIN — SODIUM CHLORIDE 1000 ML: 9 INJECTION, SOLUTION INTRAVENOUS at 14:37

## 2022-12-12 RX ADMIN — ALBUTEROL SULFATE 5 MG: 2.5 SOLUTION RESPIRATORY (INHALATION) at 14:13

## 2022-12-12 ASSESSMENT — ENCOUNTER SYMPTOMS
NAUSEA: 0
ABDOMINAL PAIN: 0
CHEST TIGHTNESS: 0
VOMITING: 0
SHORTNESS OF BREATH: 0
COUGH: 0
DIARRHEA: 0

## 2022-12-12 ASSESSMENT — PAIN DESCRIPTION - LOCATION: LOCATION: ABDOMEN

## 2022-12-12 ASSESSMENT — PAIN SCALES - GENERAL: PAINLEVEL_OUTOF10: 7

## 2022-12-12 ASSESSMENT — PAIN - FUNCTIONAL ASSESSMENT: PAIN_FUNCTIONAL_ASSESSMENT: 0-10

## 2022-12-12 NOTE — DISCHARGE INSTRUCTIONS
Please follow-up with your primary care physician as soon as possible for reevaluation of your symptoms. Drink plenty of fluids. While you have elected to leave the hospital [address], you are welcome to return if you change your mind or wish to be reevaluated at any time. I especially encourage you to return should he develop chest pain, vision changes, trouble breathing, extremity weakness/numbness or episodes of blacking out or passing out. Return if you have any other new or changing symptoms that concern you and you wish to be reevaluated.

## 2022-12-12 NOTE — ED PROVIDER NOTES
Genitourinary:  Negative for difficulty urinating and dysuria. Neurological:  Positive for dizziness, weakness and light-headedness. Negative for facial asymmetry, numbness and headaches. All other systems reviewed and are negative. Positives and Pertinent negatives as per HPI. Except as noted above in the ROS, all other systems were reviewed and negative. PAST MEDICAL HISTORY   History reviewed. No pertinent past medical history. SURGICAL HISTORY   History reviewed. No pertinent surgical history. CURRENTMEDICATIONS       Previous Medications    ALBUTEROL SULFATE HFA (PROVENTIL;VENTOLIN;PROAIR) 108 (90 BASE) MCG/ACT INHALER    Inhale 1-2 puffs into the lungs every 4-6 hours as needed    ALLOPURINOL (ZYLOPRIM) 300 MG TABLET    Take 300 mg by mouth daily    ATORVASTATIN (LIPITOR) 10 MG TABLET    Take 10 mg by mouth daily    BUDESONIDE-FORMOTEROL (SYMBICORT) 160-4.5 MCG/ACT AERO    Inhale 2 puffs into the lungs in the morning and at bedtime    GLIPIZIDE (GLUCOTROL) 10 MG TABLET    Take 10 mg by mouth 2 times daily (before meals)    JANUVIA 100 MG TABLET    Take 100 mg by mouth daily    METFORMIN (GLUCOPHAGE) 1000 MG TABLET    Take 1,000 mg by mouth 2 times daily (with meals)    MORPHINE (MS CONTIN) 30 MG EXTENDED RELEASE TABLET    Take 30 mg by mouth 2 times daily. OXYCODONE (ROXICODONE) 5 MG IMMEDIATE RELEASE TABLET    Take 5-10 mg by mouth every 6 hours as needed for Pain. PIOGLITAZONE (ACTOS) 45 MG TABLET    Take 45 mg by mouth daily         ALLERGIES     Patient has no known allergies. FAMILYHISTORY     History reviewed. No pertinent family history. SOCIAL HISTORY       Social History     Tobacco Use    Smoking status: Every Day     Packs/day: 1.00     Years: 50.00     Pack years: 50.00     Types: Cigarettes    Smokeless tobacco: Current       SCREENINGS   NIH Stroke Scale  Interval: Baseline  Level of Consciousness (1a): Alert  LOC Questions (1b):  Answers both correctly  LOC Commands (1c): Performs both tasks correctly  Best Gaze (2): Normal  Visual (3): No visual loss  Facial Palsy (4): Normal symmetrical movement  Motor Arm, Left (5a): No drift  Motor Arm, Right (5b): No drift  Motor Leg, Left (6a): No drift  Motor Leg, Right (6b): No drift  Limb Ataxia (7): Absent  Sensory (8): Normal  Best Language (9): No aphasia  Dysarthria (10): Normal  Extinction and Inattention (11): No abnormality  Total: 0Glasgow Coma Scale  Eye Opening: Spontaneous  Best Verbal Response: Oriented  Best Motor Response: Obeys commands  [place] Coma Scale Score: 15        PHYSICAL EXAM    (up to 7 for level 4, 8 or more for level 5)     ED Triage Vitals [12/12/22 1147]   BP Temp Temp Source Heart Rate Resp SpO2 Height Weight   135/72 98.3 °F (36.8 °C) Oral 98 20 96 % -- 157 lb (71.2 kg)       Physical Exam  Vitals and nursing note reviewed. Constitutional:       Appearance: He is well-developed. He is not diaphoretic. HENT:      Head: Normocephalic and atraumatic. Right Ear: Tympanic membrane normal.      Left Ear: Tympanic membrane normal.      Nose: Nose normal.      Mouth/Throat:      Mouth: Mucous membranes are dry. Pharynx: Oropharynx is clear. No oropharyngeal exudate or posterior oropharyngeal erythema. Eyes:      General:         Right eye: No discharge. Left eye: No discharge. Extraocular Movements: Extraocular movements intact. Conjunctiva/sclera: Conjunctivae normal.      Pupils: Pupils are equal, round, and reactive to light. Cardiovascular:      Rate and Rhythm: Normal rate and regular rhythm. Pulses: Normal pulses. Heart sounds: Normal heart sounds. Pulmonary:      Effort: Pulmonary effort is normal. No respiratory distress. Breath sounds: Wheezing present. Abdominal:      General: Abdomen is flat. Bowel sounds are normal.      Palpations: Abdomen is soft. Musculoskeletal:         General: Normal range of motion.       Cervical back: Normal range of motion and neck supple. Skin:     General: Skin is warm and dry. Coloration: Skin is not pale. Neurological:      Mental Status: He is alert and oriented to person, place, and time. GCS: GCS eye subscore is 4. GCS verbal subscore is 5. GCS motor subscore is 6. Cranial Nerves: Cranial nerves 2-12 are intact. Sensory: Sensation is intact. Motor: Motor function is intact. Coordination: Coordination is intact. Gait: Gait is intact. Psychiatric:         Behavior: Behavior normal.       DIAGNOSTIC RESULTS   LABS:    Labs Reviewed   CBC WITH AUTO DIFFERENTIAL - Abnormal; Notable for the following components:       Result Value    RDW 16.3 (*)     All other components within normal limits   COMPREHENSIVE METABOLIC PANEL W/ REFLEX TO MG FOR LOW K - Abnormal; Notable for the following components:    Sodium 135 (*)     Glucose 239 (*)     Creatinine 0.6 (*)     ALT 7 (*)     AST 11 (*)     All other components within normal limits   BRAIN NATRIURETIC PEPTIDE - Abnormal; Notable for the following components:    Pro- (*)     All other components within normal limits   TROPONIN   URINALYSIS WITH REFLEX TO CULTURE       When ordered only abnormal lab results are displayed. All other labs were within normal range or not returned as of this dictation. EKG: When ordered, EKG's are interpreted by the Emergency Department Physician in the absence of a cardiologist.  Please see their note for interpretation of EKG. RADIOLOGY:   Non-plain film images such as CT, Ultrasound and MRI are read by the radiologist. Plain radiographic images are visualized and preliminarily interpreted by the ED Provider with the below findings:        Interpretation per the Radiologist below, if available at the time of this note:    CT HEAD WO CONTRAST   Final Result      1.   No evidence of any definite brain lesion or definite metastasis of the   study is limited by the lack of intravenous contrast.      2.  No acute intracranial abnormalities noted. XR CHEST PORTABLE   Final Result   No acute process. PROCEDURES   Unless otherwise noted below, none     Procedures    CRITICAL CARE TIME       CONSULTS:  None      EMERGENCY DEPARTMENT COURSE and DIFFERENTIAL DIAGNOSIS/MDM:   Vitals:    Vitals:    12/12/22 1515 12/12/22 1530 12/12/22 1545 12/12/22 1600   BP: 136/83 (!) 146/65 122/70 116/68   Pulse: 78 94 90 78   Resp: 18 22 20 19   Temp:       TempSrc:       SpO2: 98% 100%  97%   Weight:           Patient was given the following medications:  Medications   albuterol (PROVENTIL) nebulizer solution 5 mg (5 mg Nebulization Given 12/12/22 1413)   ipratropium-albuterol (DUONEB) nebulizer solution 1 ampule (1 ampule Inhalation Given 12/12/22 1413)   0.9 % sodium chloride bolus (0 mLs IntraVENous Stopped 12/12/22 1540)         Is this patient to be included in the SEP-1 Core Measure due to severe sepsis or septic shock? No   Exclusion criteria - the patient is NOT to be included for SEP-1 Core Measure due to: Infection is not suspected    Patient with a history of metastatic carcinoma presented to the emergency department today complaining of several days of generalized weakness, fatigue, decreased appetite as well as dizziness and lightheadedness with ambulation. He is alert and oriented x3 with GCS 15. He has a normal neurologic exam.  He denies having any chest pain or shortness of breath. He is noted to have diffuse wheezing on exam however he states that this is normal.  His EKG shows no signs of acute ischemia or infarction. Troponin and proBNP are normal and chest x-ray shows no acute process. With the dizziness and lightheadedness with ambulation a CT head is completed which shows no acute intracranial abnormality. CBC, BMP and LFTs are unremarkable. Patient states he urinated while waiting in the waiting room and is unable to urinate at this time.   On reexamination he states he is ready to go home. He states he does not want to wait any longer or be admitted. We did have a lengthy discussion regarding his symptoms including near syncope when he stands up. He understands but still wants to leave [address] The patient has decided to leave against medical advice. The patient is awake and alert, non-intoxicated, non-suicidal, nonpsychotic. There is no medical condition that prevents or inhibits their understanding of the risks/benefits of their decision. I discussed the nature and purpose, risks and benefits, as well as, the alternatives of admission with Christiano Johnson. Christiano oJhnson was given the time and opportunity to ask questions and consider their options, and after the discussion, Christiano Johnson decided to refuse. I informed Christiano Johnson that refusal could lead to, but was not limited to, death, permanent disability, or severe pain. If present, I asked the relatives or significant others of Christiano Johnson to dissuade them without success. Prior to refusing, their nurse and I determined and agreed that Christiano Johnson had the capacity to make this decision and understood the consequences of that decision. Christiano Johnson signed the refusal of treatment form and their nurse signed the form agreeing that the patient/guardian had received informed consent. After refusal, I made every reasonable opportunity to treat Christiano Johnson to the best of my ability. FINAL IMPRESSION      1. Near syncope    2. Contusion of left elbow, initial encounter    3.  Primary malignant neoplasm of lung metastatic to other site, unspecified laterality [facility])          DISPOSITION/PLAN   DISPOSITION [place] 12/12/2022 04:41:24 PM      PATIENT REFERRED TO:  Matilda Gillis [place] [address]  [phone]    In 1 day      DISCHARGE MEDICATIONS:  New Prescriptions    No medications on file       DISCONTINUED MEDICATIONS:  Discontinued Medications    No medications on file              (Please note that portions of this note were completed with a voice recognition program.  Efforts were made to edit the dictations but occasionally words are mis-transcribed.)    Arnoldo Stringer PA-C (electronically signed)           Arnoldo Stringer PA-C  12/12/22 3098

## 2022-12-12 NOTE — ED NOTES
Dr. Yamilex Bell at bedside to discuss care options with Pt and Pt decided to leave AMA.      Sharon Leal RN  12/12/22 8067

## 2022-12-14 NOTE — ED PROVIDER NOTES
In addition to the advanced practice provider, I personally saw Sebas Shelton and performed a substantive portion of the visit including all aspects of the medical decision making. Briefly, this is a 67 y.o. male here for generalized weakness and lightheadedness. Patient with history of metastatic lung cancer. Patient reports he has felt off balance. Symptoms worsened today, feels as though he is about to pass out with movement, sitting up and standing. He denies any loss of consciousness however. Denies any fevers, chills, chest pain, palpitations or shortness of breath. .    On exam, patient afebrile and nontoxic. No distress. Heart RRR. Lungs CTAB. Abdomen soft, nondistended, nontender to palpation in all quadrants. A&Ox4. Speech clear, face symmetric. PERRL, no nystagmus. CN 2-12 intact. 5/5 motor and sensation grossly intact all extremities. No pronator drift. Normal finger to nose, normal heel to shin. EKG  EKG was reviewed by emergency department physician in the absence of a cardiologist    Narrow complex sinus rhythm, rate 66, normal axis, normal [place] and QRS intervals, normal Qtc, no ST elevations or depressions, TWI aVL, impression NSR with sinus arrhythmia and nonspecific T wave morphology, no STEMI, no comparison available      Screenings  NIH Stroke Scale  Interval: Baseline  Level of Consciousness (1a): Alert  LOC Questions (1b): Answers both correctly  LOC Commands (1c): Performs both tasks correctly  Best Gaze (2): Normal  Visual (3): No visual loss  Facial Palsy (4): Normal symmetrical movement  Motor Arm, Left (5a): No drift  Motor Arm, Right (5b): No drift  Motor Leg, Left (6a): No drift  Motor Leg, Right (6b):  No drift  Limb Ataxia (7): Absent  Sensory (8): Normal  Best Language (9): No aphasia  Dysarthria (10): Normal  Extinction and Inattention (11): No abnormality  Total: 0Glasgow Coma Scale  Eye Opening: Spontaneous  Best Verbal Response: Oriented  Best Motor Response: Obeys initial encounter    3. Primary malignant neoplasm of lung metastatic to other site, unspecified laterality (HCC)        Blood pressure 116/68, pulse 78, temperature 98.3 °F (36.8 °C), temperature source Oral, resp. rate 19, weight 157 lb (71.2 kg), SpO2 97 %. For further details of [facility] emergency department encounter, please see documentation by advanced practice provider, ALLEGRA Mcclendon.     Cuate Triana DO (electronically signed)  Attending Emergency Physician       Cuate Triana DO  12/14/22 1008